# Patient Record
Sex: FEMALE | Race: WHITE | NOT HISPANIC OR LATINO | Employment: FULL TIME | URBAN - METROPOLITAN AREA
[De-identification: names, ages, dates, MRNs, and addresses within clinical notes are randomized per-mention and may not be internally consistent; named-entity substitution may affect disease eponyms.]

---

## 2018-03-08 ENCOUNTER — HISTORICAL (OUTPATIENT)
Dept: SURGERY | Facility: HOSPITAL | Age: 23
End: 2018-03-08

## 2018-03-08 LAB
APTT PPP: 24.8 SECOND(S) (ref 25–35)
B-HCG SERPL QL: NEGATIVE
INR PPP: 1 (ref 0–1.2)
PROTHROMBIN TIME: 10.7 SECOND(S) (ref 9–12)

## 2018-03-13 ENCOUNTER — HISTORICAL (OUTPATIENT)
Dept: ANESTHESIOLOGY | Facility: HOSPITAL | Age: 23
End: 2018-03-13

## 2018-03-13 LAB — B-HCG SERPL QL: NEGATIVE

## 2020-01-29 ENCOUNTER — HISTORICAL (OUTPATIENT)
Dept: LAB | Facility: HOSPITAL | Age: 25
End: 2020-01-29

## 2020-01-29 LAB
ABS NEUT (OLG): 5 X10(3)/MCL (ref 1.5–6.9)
ALBUMIN SERPL-MCNC: 4.2 GM/DL (ref 3.4–5)
ALBUMIN/GLOB SERPL: 1 RATIO
ALP SERPL-CCNC: 54 UNIT/L (ref 30–113)
ALT SERPL-CCNC: 23 UNIT/L (ref 10–45)
AST SERPL-CCNC: 14 UNIT/L (ref 15–37)
BASOPHILS NFR BLD MANUAL: 1 % (ref 0–1)
BILIRUB SERPL-MCNC: 0.6 MG/DL (ref 0.1–0.9)
BILIRUBIN DIRECT+TOT PNL SERPL-MCNC: 0.1 MG/DL (ref 0–0.3)
BILIRUBIN DIRECT+TOT PNL SERPL-MCNC: 0.5 MG/DL
BUN SERPL-MCNC: 12 MG/DL (ref 10–20)
CALCIUM SERPL-MCNC: 9.4 MG/DL (ref 8–10.5)
CHLORIDE SERPL-SCNC: 106 MMOL/L (ref 100–108)
CHOLEST SERPL-MCNC: 129 MG/DL (ref 140–200)
CHOLEST/HDLC SERPL: 3 MG/DL (ref 0–8)
CO2 SERPL-SCNC: 27 MMOL/L (ref 21–35)
CREAT SERPL-MCNC: 0.88 MG/DL (ref 0.7–1.3)
EOSINOPHIL NFR BLD MANUAL: 5 % (ref 0–5)
ERYTHROCYTE [DISTWIDTH] IN BLOOD BY AUTOMATED COUNT: 13.2 % (ref 11.5–17)
EST. AVERAGE GLUCOSE BLD GHB EST-MCNC: 94 MG/DL
GLOBULIN SER-MCNC: 4.2 GM/DL
GLUCOSE SERPL-MCNC: 85 MG/DL (ref 75–116)
GRANULOCYTES NFR BLD MANUAL: 61 % (ref 47–80)
HBA1C MFR BLD: 4.9 % (ref 4.8–6)
HCT VFR BLD AUTO: 46.1 % (ref 36–48)
HDLC SERPL-MCNC: 51 MG/DL (ref 35–59)
HGB BLD-MCNC: 14.9 GM/DL (ref 12–16)
LDLC SERPL CALC-MCNC: 76 MG/DL (ref 100–129)
LYMPHOCYTES NFR BLD MANUAL: 26 % (ref 15–40)
MCH RBC QN AUTO: 31 PG (ref 27–34)
MCHC RBC AUTO-ENTMCNC: 32 GM/DL (ref 31–36)
MCV RBC AUTO: 95 FL (ref 80–99)
MONOCYTES NFR BLD MANUAL: 7 % (ref 4–12)
NEUTROPHILS # BLD AUTO: 5 X10(3)/MCL (ref 1.5–6.9)
PLATELET # BLD AUTO: 235 X10(3)/MCL (ref 140–400)
PLATELET # BLD EST: ADEQUATE 10*3/UL
PMV BLD AUTO: 9.9 FL (ref 6.8–10)
POTASSIUM SERPL-SCNC: 4.4 MMOL/L (ref 3.6–5.2)
PROT SERPL-MCNC: 8.4 GM/DL (ref 6.4–8.2)
RBC # BLD AUTO: 4.85 X10(6)/MCL (ref 4.2–5.4)
RBC MORPH BLD: NORMAL
SODIUM SERPL-SCNC: 142 MMOL/L (ref 135–145)
T4 SERPL-MCNC: 7 MCG/DL (ref 5.3–11.5)
TRIGL SERPL-MCNC: 65 MG/DL (ref 35–150)
TSH SERPL-ACNC: 1.21 MIU/ML (ref 0.36–3.74)
VLDLC SERPL CALC-MCNC: 13 MG/DL
WBC # SPEC AUTO: 8.3 X10(3)/MCL (ref 4.5–11.5)

## 2021-05-19 ENCOUNTER — HISTORICAL (OUTPATIENT)
Dept: ADMINISTRATIVE | Facility: HOSPITAL | Age: 26
End: 2021-05-19

## 2022-04-10 ENCOUNTER — HISTORICAL (OUTPATIENT)
Dept: ADMINISTRATIVE | Facility: HOSPITAL | Age: 27
End: 2022-04-10

## 2022-04-11 ENCOUNTER — HISTORICAL (OUTPATIENT)
Dept: ADMINISTRATIVE | Facility: HOSPITAL | Age: 27
End: 2022-04-11

## 2022-04-28 VITALS
OXYGEN SATURATION: 99 % | SYSTOLIC BLOOD PRESSURE: 125 MMHG | WEIGHT: 220 LBS | HEIGHT: 64 IN | DIASTOLIC BLOOD PRESSURE: 72 MMHG | BODY MASS INDEX: 37.56 KG/M2

## 2022-04-28 VITALS
SYSTOLIC BLOOD PRESSURE: 125 MMHG | OXYGEN SATURATION: 99 % | BODY MASS INDEX: 37.56 KG/M2 | WEIGHT: 220 LBS | HEIGHT: 64 IN | DIASTOLIC BLOOD PRESSURE: 72 MMHG

## 2022-04-29 NOTE — OP NOTE
Patient:   Natalie Ibarra            MRN: [id number]            FIN: [id number]               Age:   23 years     Sex:  Female     :  1995   Associated Diagnoses:   Stomach cramps; Gastritis; Diarrhea; Abdominal pain   Author:   Tita Lucio MD      Operative Note   Operative Information   Date/ Time:  3/13/2018 11:33:00.     Procedures Performed: Procedure Code   Colonoscopy, flexible; diagnostic, including collection of specimen(s) by brushing or washing, when performed (separate procedure) (17185).  Colonoscopy, flexible; with biopsy, single or multiple (58646).  Esophagogastroduodenoscopy, flexible, transoral; diagnostic, including collection of specimen(s) by brushing or washing, when performed (separate procedure) (09818).  Esophagogastroduodenoscopy, flexible, transoral; with biopsy, single or multiple (64053)..     Indications: 23-year-old white female with ongoing complaints of abdominal bloating nausea with vomiting of undigested food in need of diagnostic evaluation with family history of GI malignancy..     Preoperative Diagnosis: Stomach cramps (LSS20-EQ R10.9), Gastritis (JRE02-JT K29.70), Diarrhea (GHP22-BY R19.7), Abdominal pain (CFA21-HK R10.9).     Postoperative Diagnosis: Stomach cramps (FNP33-RP R10.9), Gastritis (XKY70-PC K29.70), Diarrhea (CNN84-PJ R19.7), Abdominal pain (NJJ14-PP R10.9).     Surgeon: Tita Lucio MD.     Anesthesia: intravenous Mac.     Speciman Removed: 1. gastric antral mucosa  2. Sigmoid colon mucosa  3. Rectal mucosa.     Description of Procedure/Findings/    Complications: procedure in detail: Patient was brought to the endoscopy suite laid in slight supine position and intravenous anesthesia provided. An endoscope was passed through the oropharynx intubating the esophagus with easy transit into the stomach. Upon entering the stomach was fully insufflated. It was advanced in the duodenum which and third fourth portion all appeared to be grossly  normal. Scope was then withdrawn back into the stomach and biopsies performed of the gastric antrum ×1.  the scope was then retroflexed revealing a normal appearing cardia fundus body of the stomach otherwise. Return to new to position the scope was withdrawn to the Z line measuring approximately 35 cm from the incisors. There were no signs of reflux or changes of the GE junction seen. The scope was then slowly withdrawn and a neutral position and after the stomach was evacuated of air. The patient was then transferred to the left lateral decubitus position for colonoscopy.    Digital rectal exam performed exhibiting good anal rectal tone and no masses. An endoscope was passed through the anus into main the rectum and with gentle insufflation reaching the cecum. Upon reaching the cecum pictures are taken. Scope was slowly withdrawn the mucosa evaluated. Overall the cecum ascending transverse descending and rectosigmoid colon all appeared to be grossly normal without changes seen. There were no masses or polyps identified. Random biopsies were however performed in the sigmoid and rectum region. These areas appeared to have excessive amounts of mucus noted. The scope was then withdrawn to the distal rectum retroflexed revealing no internal hemorrhoids. Scope was then returned to a neutral position the colon was evacuated of air. The patient was then relieved anesthesia stable condition and transferred to postanesthesia care unit..     Esimated blood loss: loss  3  cc.     Findings: overall normal-appearing EGD and colonoscopy.     Complications: None.

## 2022-06-02 ENCOUNTER — HOSPITAL ENCOUNTER (EMERGENCY)
Facility: HOSPITAL | Age: 27
Discharge: LEFT BEFORE DISCHARGE | End: 2022-06-02
Payer: MEDICAID

## 2022-06-02 VITALS
SYSTOLIC BLOOD PRESSURE: 136 MMHG | HEART RATE: 83 BPM | BODY MASS INDEX: 31.65 KG/M2 | HEIGHT: 65 IN | RESPIRATION RATE: 16 BRPM | OXYGEN SATURATION: 99 % | WEIGHT: 190 LBS | DIASTOLIC BLOOD PRESSURE: 90 MMHG | TEMPERATURE: 99 F

## 2022-06-02 LAB
AMORPH PHOS CRY URNS QL MICRO: ABNORMAL /HPF
APPEARANCE UR: CLEAR
BACTERIA #/AREA URNS AUTO: ABNORMAL /HPF
BILIRUB UR QL STRIP.AUTO: NEGATIVE MG/DL
COLOR UR AUTO: YELLOW
GLUCOSE UR QL STRIP.AUTO: NEGATIVE MG/DL
KETONES UR QL STRIP.AUTO: NEGATIVE MG/DL
LEUKOCYTE ESTERASE UR QL STRIP.AUTO: NEGATIVE UNIT/L
NITRITE UR QL STRIP.AUTO: NEGATIVE
PH UR STRIP.AUTO: 8 [PH]
PROT UR QL STRIP.AUTO: NEGATIVE MG/DL
RBC #/AREA URNS AUTO: ABNORMAL /HPF
RBC UR QL AUTO: ABNORMAL UNIT/L
SP GR UR STRIP.AUTO: 1.02
SQUAMOUS #/AREA URNS AUTO: ABNORMAL /LPF
UROBILINOGEN UR STRIP-ACNC: 0.2 MG/DL
WBC #/AREA URNS AUTO: ABNORMAL /HPF

## 2022-06-02 PROCEDURE — 99900041 HC LEFT WITHOUT BEING SEEN- EMERGENCY

## 2022-06-02 PROCEDURE — 81001 URINALYSIS AUTO W/SCOPE: CPT | Performed by: GENERAL ACUTE CARE HOSPITAL

## 2022-07-28 ENCOUNTER — HOSPITAL ENCOUNTER (EMERGENCY)
Facility: HOSPITAL | Age: 27
Discharge: LEFT BEFORE DISCHARGE | End: 2022-07-28
Attending: EMERGENCY MEDICINE
Payer: MEDICAID

## 2022-07-28 VITALS
WEIGHT: 186 LBS | SYSTOLIC BLOOD PRESSURE: 118 MMHG | DIASTOLIC BLOOD PRESSURE: 86 MMHG | RESPIRATION RATE: 18 BRPM | BODY MASS INDEX: 30.99 KG/M2 | HEIGHT: 65 IN | TEMPERATURE: 98 F | OXYGEN SATURATION: 99 % | HEART RATE: 78 BPM

## 2022-07-28 DIAGNOSIS — J02.0 STREPTOCOCCAL SORE THROAT: ICD-10-CM

## 2022-07-28 DIAGNOSIS — Z53.21 ELOPED FROM EMERGENCY DEPARTMENT: Primary | ICD-10-CM

## 2022-07-28 LAB
SARS-COV-2 RDRP RESP QL NAA+PROBE: NEGATIVE
STREP A PCR (OHS): DETECTED

## 2022-07-28 PROCEDURE — 87635 SARS-COV-2 COVID-19 AMP PRB: CPT | Performed by: EMERGENCY MEDICINE

## 2022-07-28 PROCEDURE — 87631 RESP VIRUS 3-5 TARGETS: CPT | Performed by: EMERGENCY MEDICINE

## 2022-07-28 PROCEDURE — 99282 EMERGENCY DEPT VISIT SF MDM: CPT

## 2022-07-28 NOTE — ED PROVIDER NOTES
Encounter Date: 7/28/2022       History     Chief Complaint   Patient presents with    Throat Pain     Pt c/o sore throat and bilateral ear pain x 2 days. Fever 101 this am.     Patient eloped was never seen by the emergency physician high was riding her discharge instructions anticipating going in the room and treating her strep throat she eloped without being seen        Review of patient's allergies indicates:  No Known Allergies  Past Medical History:   Diagnosis Date    Anxiety disorder, unspecified     Asthma      Past Surgical History:   Procedure Laterality Date    ADENOIDECTOMY      CHOLECYSTECTOMY       No family history on file.  Social History     Tobacco Use    Smoking status: Current Every Day Smoker     Packs/day: 0.50     Types: Cigarettes    Smokeless tobacco: Never Used   Substance Use Topics    Alcohol use: Yes     Comment: occasional    Drug use: Yes     Types: Marijuana     Comment: occasional     Review of Systems    Physical Exam     Initial Vitals [07/28/22 1216]   BP Pulse Resp Temp SpO2   118/86 78 18 98.4 °F (36.9 °C) 99 %      MAP       --         Physical Exam    ED Course   Procedures  Labs Reviewed   STREP GROUP A BY PCR - Abnormal; Notable for the following components:       Result Value    STREP A PCR ([facility]) Detected (*)     All other components within normal limits   SARS-COV-2 RNA AMPLIFICATION, QUAL - Normal          Imaging Results    None          Medications - No data to display                       Clinical Impression:   Final diagnoses:  [Z53.21] Eloped from emergency department (Primary)  [J02.0] Streptococcal sore throat          ED Disposition Condition    AMA               Boubacar Vega MD  07/28/22 8328

## 2022-07-28 NOTE — DISCHARGE INSTRUCTIONS
Warm saline gargles 1/2 tsp salt per cup per  water every couple of hours if needed for pain    Continue Tylenol or ibuprofen for any pain

## 2022-08-20 ENCOUNTER — HOSPITAL ENCOUNTER (EMERGENCY)
Facility: HOSPITAL | Age: 27
Discharge: HOME | End: 2022-08-20
Attending: INTERNAL MEDICINE
Payer: MEDICAID

## 2022-08-20 VITALS
BODY MASS INDEX: 31.16 KG/M2 | OXYGEN SATURATION: 98 % | SYSTOLIC BLOOD PRESSURE: 117 MMHG | DIASTOLIC BLOOD PRESSURE: 73 MMHG | HEIGHT: 65 IN | RESPIRATION RATE: 20 BRPM | WEIGHT: 187 LBS | TEMPERATURE: 98 F | HEART RATE: 65 BPM

## 2022-08-20 DIAGNOSIS — R07.81 PLEURITIC CHEST PAIN: Primary | ICD-10-CM

## 2022-08-20 LAB
ALBUMIN SERPL-MCNC: 3.7 GM/DL (ref 3.5–5)
ALBUMIN/GLOB SERPL: 1.1 RATIO (ref 1.1–2)
ALP SERPL-CCNC: 58 UNIT/L (ref 40–150)
ALT SERPL-CCNC: 8 UNIT/L (ref 0–55)
APPEARANCE UR: CLEAR
AST SERPL-CCNC: 13 UNIT/L (ref 5–34)
B-HCG SERPL QL: NEGATIVE
BACTERIA #/AREA URNS AUTO: ABNORMAL /HPF
BASOPHILS # BLD AUTO: 0.06 X10(3)/MCL (ref 0–0.2)
BASOPHILS NFR BLD AUTO: 0.5 %
BILIRUB UR QL STRIP.AUTO: NEGATIVE MG/DL
BILIRUBIN DIRECT+TOT PNL SERPL-MCNC: 0.7 MG/DL
BUN SERPL-MCNC: 8 MG/DL (ref 7–18.7)
CALCIUM SERPL-MCNC: 9.1 MG/DL (ref 8.4–10.2)
CHLORIDE SERPL-SCNC: 109 MMOL/L (ref 98–107)
CO2 SERPL-SCNC: 21 MMOL/L (ref 22–29)
COLOR UR AUTO: YELLOW
CREAT SERPL-MCNC: 0.76 MG/DL (ref 0.55–1.02)
EOSINOPHIL # BLD AUTO: 0.29 X10(3)/MCL (ref 0–0.9)
EOSINOPHIL NFR BLD AUTO: 2.6 %
ERYTHROCYTE [DISTWIDTH] IN BLOOD BY AUTOMATED COUNT: 13.2 % (ref 11.5–17)
GFR SERPLBLD CREATININE-BSD FMLA CKD-EPI: >60 MLS/MIN/1.73/M2
GLOBULIN SER-MCNC: 3.4 GM/DL (ref 2.4–3.5)
GLUCOSE SERPL-MCNC: 85 MG/DL (ref 74–100)
GLUCOSE UR QL STRIP.AUTO: NEGATIVE MG/DL
HCT VFR BLD AUTO: 40 % (ref 37–47)
HGB BLD-MCNC: 13.6 GM/DL (ref 12–16)
IMM GRANULOCYTES # BLD AUTO: 0.05 X10(3)/MCL (ref 0–0.04)
IMM GRANULOCYTES NFR BLD AUTO: 0.4 %
KETONES UR QL STRIP.AUTO: NEGATIVE MG/DL
LEUKOCYTE ESTERASE UR QL STRIP.AUTO: ABNORMAL UNIT/L
LYMPHOCYTES # BLD AUTO: 2.15 X10(3)/MCL (ref 0.6–4.6)
LYMPHOCYTES NFR BLD AUTO: 19 %
MCH RBC QN AUTO: 31.9 PG (ref 27–31)
MCHC RBC AUTO-ENTMCNC: 34 MG/DL (ref 33–36)
MCV RBC AUTO: 93.7 FL (ref 80–94)
MONOCYTES # BLD AUTO: 0.7 X10(3)/MCL (ref 0.1–1.3)
MONOCYTES NFR BLD AUTO: 6.2 %
NEUTROPHILS # BLD AUTO: 8.1 X10(3)/MCL (ref 2.1–9.2)
NEUTROPHILS NFR BLD AUTO: 71.3 %
NITRITE UR QL STRIP.AUTO: NEGATIVE
PH UR STRIP.AUTO: 5 [PH]
PLATELET # BLD AUTO: 252 X10(3)/MCL (ref 130–400)
PMV BLD AUTO: 10.1 FL (ref 7.4–10.4)
POTASSIUM SERPL-SCNC: 4.2 MMOL/L (ref 3.5–5.1)
PROT SERPL-MCNC: 7.1 GM/DL (ref 6.4–8.3)
PROT UR QL STRIP.AUTO: NEGATIVE MG/DL
RBC # BLD AUTO: 4.27 X10(6)/MCL (ref 4.2–5.4)
RBC #/AREA URNS AUTO: ABNORMAL /HPF
RBC UR QL AUTO: NEGATIVE UNIT/L
SODIUM SERPL-SCNC: 139 MMOL/L (ref 136–145)
SP GR UR STRIP.AUTO: 1.02
SQUAMOUS #/AREA URNS AUTO: ABNORMAL /HPF
UROBILINOGEN UR STRIP-ACNC: 0.2 MG/DL
WBC # SPEC AUTO: 11.3 X10(3)/MCL (ref 4.5–11.5)
WBC #/AREA URNS AUTO: ABNORMAL /HPF

## 2022-08-20 PROCEDURE — 85025 COMPLETE CBC W/AUTO DIFF WBC: CPT | Performed by: INTERNAL MEDICINE

## 2022-08-20 PROCEDURE — 36415 COLL VENOUS BLD VENIPUNCTURE: CPT | Performed by: INTERNAL MEDICINE

## 2022-08-20 PROCEDURE — 99284 EMERGENCY DEPT VISIT MOD MDM: CPT | Mod: 25

## 2022-08-20 PROCEDURE — 80053 COMPREHEN METABOLIC PANEL: CPT | Performed by: INTERNAL MEDICINE

## 2022-08-20 PROCEDURE — 81025 URINE PREGNANCY TEST: CPT | Performed by: INTERNAL MEDICINE

## 2022-08-20 PROCEDURE — 81001 URINALYSIS AUTO W/SCOPE: CPT | Performed by: INTERNAL MEDICINE

## 2022-08-20 RX ORDER — IBUPROFEN 400 MG/1
400 TABLET ORAL EVERY 6 HOURS PRN
Qty: 20 TABLET | Refills: 0 | Status: SHIPPED | OUTPATIENT
Start: 2022-08-20 | End: 2024-01-25 | Stop reason: ALTCHOICE

## 2022-08-20 NOTE — ED PROVIDER NOTES
08/20/2022         11:26 AM    Source of History:  History obtained from the patient.       Chief complaint:  From Nurse Triage:  Pleurisy (Pt c/o mid chest pain radiating to rt shoulder and arm since yesterday. Pt states pain worse with movement and coughing.)    HPI:  Natalie Ibarra is a 27 y.o. female presenting with Pleurisy (Pt c/o mid chest pain radiating to rt shoulder and arm since yesterday. Pt states pain worse with movement and coughing.)       Patient with history of asthma comes to the emergency room with complaint of right-sided chest pain which is going on for the last 2-3 days mainly in the right upper chest going to the right shoulder gets worse when she moves the arm and gets worse when she takes a deep breath or she coughs.  Denies any injuries, denies any other complaints whatsoever including fever, chills, cough, no shortness of breath.  She does not feel it is her asthma    Review of Systems   Constitutional symptoms:  No Fever. No Chills    Skin symptoms:  No Rash.    Eye symptoms:  No Visual disturbance reported.   ENMT symptoms:  No Sore throat,    Respiratory symptoms:  No Shortness of Breath, no Cough, no Wheezing.    Cardiovascular symptoms: Chest Pain right upper, going to the right shoulder, No Palpitations, No Tachycardia.    Gastrointestinal symptoms:  No Abdominal Pain, No Nausea, No Vomiting, No Diarrhea, No Constipation.    Genitourinary symptoms:  No Dysuria,    Musculoskeletal symptoms:  No Back pain,    Neurologic symptoms:  No Headache, No Dizziness.    Psychiatric symptoms:  No Anxiety, No Depression, No Substance Abuse.              Additional review of systems information: Patient Denies Any Other Complaints.  All Other Systems Reviewed With Patient And Negative.    ALLEGIES:  Review of patient's allergies indicates:  No Known Allergies    HOME MEDICINES:  No current facility-administered medications for this encounter.    Current Outpatient Medications:     ibuprofen  (ADVIL,MOTRIN) 400 MG tablet, Take 1 tablet (400 mg total) by mouth every 6 (six) hours as needed., Disp: 20 tablet, Rfl: 0    PMH:  As per HPI and below:    Reviewed and updated in chart.    PAST MEDICAL HISTORY:  Past Medical History:   Diagnosis Date    Anxiety disorder, unspecified     Asthma     Depression         PAST SURGICAL HISTORY:  Past Surgical History:   Procedure Laterality Date    ADENOIDECTOMY      CHOLECYSTECTOMY         SOCIAL HISTORY:  Social History     Tobacco Use    Smoking status: Current Every Day Smoker     Packs/day: 0.50     Types: Cigarettes    Smokeless tobacco: Never Used   Substance Use Topics    Alcohol use: Yes     Comment: occasional    Drug use: Yes     Types: Marijuana     Comment: occasional       FAMILY HISTORY:  Family History   Problem Relation Age of Onset    Hypertension Mother         PROBLEM LIST:  There is no problem list on file for this patient.       PHYSICAL EXAM:      ED Triage Vitals [08/20/22 1028]   /73   Pulse 65   Resp 20   Temp 98.4 °F (36.9 °C)   SpO2 98 %        Vital Signs: Reviewed As In Chart.  General:  Alert, No Cardiorespiratory Distress Noted.   Skin: Normal For Ethnic Origin  Eye:  Extraocular Movements Are Intact.   Cardiovascular:  Regular Rate And Rhythm, No Murmur, No Pedal Edema.    Respiratory:  Respirations Nonlabored, No Respiratory Distress, Good Bilateral Air Entry, No Rales, No Rhonchi.  Mild tenderness in the right upper chest wall, mild tenderness in the right shoulder   Musculoskeletal:  No Gross Deformity Noted.   Gastrointestinal:  Soft, Non Distended, Non Tender, Normal Bowel Sounds.    Neurological:  Alert And Oriented To Person, Place, Time, And Situation, Normal Motor Observed, Normal Speech Observed.    Psychiatric:  Cooperative, Appropriate Mood & Affect.    INITIAL IMPRESSION/ DIFFERENTIAL DX:      MEDICAL DECISION MAKING:      Reviewed Nurses Note. Reviewed Vital Signs.     Reviewed Pertinent old records,  History and updated as necessary.    27 y.o. female with Pleurisy (Pt c/o mid chest pain radiating to rt shoulder and arm since yesterday. Pt states pain worse with movement and coughing.)    Patient with right-sided chest pain going to the right shoulder, gets worse on movement and on coughing and on deep breathing.  We are going to do a chest x-ray blood work and decide further.  She says she was not exposed to anybody with COVID-19.    ED WORKUP AND COURSE:  ED ORDERS:  Orders Placed This Encounter   Procedures    X-Ray Chest 1 View    CBC auto differential    Comprehensive metabolic panel    Urinalysis    HCG Qualitative Urine    CBC with Differential    Urinalysis, Microscopic       Medications - No data to display             ED LABS ORDERED AND REVIEWED:  Admission on 08/20/2022   Component Date Value Ref Range Status    Sodium Level 08/20/2022 139  136 - 145 mmol/L Final    Potassium Level 08/20/2022 4.2  3.5 - 5.1 mmol/L Final    Chloride 08/20/2022 109 (A) 98 - 107 mmol/L Final    Carbon Dioxide 08/20/2022 21 (A) 22 - 29 mmol/L Final    Glucose Level 08/20/2022 85  74 - 100 mg/dL Final    Blood Urea Nitrogen 08/20/2022 8.0  7.0 - 18.7 mg/dL Final    Creatinine 08/20/2022 0.76  0.55 - 1.02 mg/dL Final    Calcium Level Total 08/20/2022 9.1  8.4 - 10.2 mg/dL Final    Protein Total 08/20/2022 7.1  6.4 - 8.3 gm/dL Final    Albumin Level 08/20/2022 3.7  3.5 - 5.0 gm/dL Final    Globulin 08/20/2022 3.4  2.4 - 3.5 gm/dL Final    Albumin/Globulin Ratio 08/20/2022 1.1  1.1 - 2.0 ratio Final    Bilirubin Total 08/20/2022 0.7  <=1.5 mg/dL Final    Alkaline Phosphatase 08/20/2022 58  40 - 150 unit/L Final    Alanine Aminotransferase 08/20/2022 8  0 - 55 unit/L Final    Aspartate Aminotransferase 08/20/2022 13  5 - 34 unit/L Final    eGFR 08/20/2022 >60  mls/min/1.73/m2 Final    Color, UA 08/20/2022 Yellow  Yellow, Colorless, Other, Clear Final    Appearance,  08/20/2022 Clear  Clear Final     Specific Gravity, UA 08/20/2022 1.025   Final    pH, UA 08/20/2022 5.0  5.0, 5.5, 6.0, 6.5, 7.0, 7.5, 8.0, 8.5 Final    Protein, UA 08/20/2022 Negative  Negative, 300  mg/dL Final    Glucose, UA 08/20/2022 Negative  Negative, Normal mg/dL Final    Ketones, UA 08/20/2022 Negative  Negative, +1, +2, +3, +4, +5, >=160, >=80 mg/dL Final    Blood, UA 08/20/2022 Negative  Negative unit/L Final    Bilirubin, UA 08/20/2022 Negative  Negative mg/dL Final    Urobilinogen, UA 08/20/2022 0.2  0.2, 1.0, Normal mg/dL Final    Nitrites, UA 08/20/2022 Negative  Negative Final    Leukocyte Esterase, UA 08/20/2022 Small (A) Negative, 75  unit/L Final    Beta hCG Qualitative, Urine 08/20/2022 Negative  Negative Final    WBC 08/20/2022 11.3  4.5 - 11.5 x10(3)/mcL Final    RBC 08/20/2022 4.27  4.20 - 5.40 x10(6)/mcL Final    Hgb 08/20/2022 13.6  12.0 - 16.0 gm/dL Final    Hct 08/20/2022 40.0  37.0 - 47.0 % Final    MCV 08/20/2022 93.7  80.0 - 94.0 fL Final    MCH 08/20/2022 31.9 (A) 27.0 - 31.0 pg Final    MCHC 08/20/2022 34.0  33.0 - 36.0 mg/dL Final    RDW 08/20/2022 13.2  11.5 - 17.0 % Final    Platelet 08/20/2022 252  130 - 400 x10(3)/mcL Final    MPV 08/20/2022 10.1  7.4 - 10.4 fL Final    Neut % 08/20/2022 71.3  % Final    Lymph % 08/20/2022 19.0  % Final    Mono % 08/20/2022 6.2  % Final    Eos % 08/20/2022 2.6  % Final    Basophil % 08/20/2022 0.5  % Final    Lymph # 08/20/2022 2.15  0.6 - 4.6 x10(3)/mcL Final    Neut # 08/20/2022 8.1  2.1 - 9.2 x10(3)/mcL Final    Mono # 08/20/2022 0.70  0.1 - 1.3 x10(3)/mcL Final    Eos # 08/20/2022 0.29  0 - 0.9 x10(3)/mcL Final    Baso # 08/20/2022 0.06  0 - 0.2 x10(3)/mcL Final    IG# 08/20/2022 0.05 (A) 0 - 0.04 x10(3)/mcL Final    IG% 08/20/2022 0.4  % Final    Bacteria, UA 08/20/2022 Rare  None Seen, Rare, Occasional /HPF Final    RBC, UA 08/20/2022 0-2  None Seen, 0-2, 3-5, 0-5 /HPF Final    WBC, UA 08/20/2022 0-2  None Seen, 0-2, 3-5, 0-5 /HPF  Final    Squamous Epithelial Cells, UA 08/20/2022 Few (A) None Seen, Rare, Occasional, Occ /HPF Final       RADIOLOGY STUDIES ORDERED AND REVIEWED:  Imaging Results          X-Ray Chest 1 View (Preliminary result)  Result time 08/20/22 11:28:06    ED Interpretation by Sandra Troy MD (08/20/22 11:28:06, [facility] - Emergency Dept, Emergency Medicine)    Chest One View: No Focal Consolidation, No Acute Cardiopulmonary abnormality identified grossly.                              ED COURSE AND REEVALUATIONS:  Vitals:    08/20/22 1028   BP: 117/73   Pulse: 65   Resp: 20   Temp: 98.4 °F (36.9 °C)       PROCEDURES PERFORMED IN ED:  Procedures                DIAGNOSTIC IMPRESSION:      1. Pleuritic chest pain         ED Disposition Condition    Discharge Stable             Medication List      START taking these medications    ibuprofen 400 MG tablet  Commonly known as: ADVIL,MOTRIN  Take 1 tablet (400 mg total) by mouth every 6 (six) hours as needed.           Where to Get Your Medications      These medications were sent to [facility] [address] [facility] - [address]  [address] [place] [address]    Phone: [phone]   · ibuprofen 400 MG tablet           Follow-up Information     PMD In 2 days.                        ED Prescriptions     Medication Sig Dispense Start Date End Date Auth. Provider    ibuprofen (ADVIL,MOTRIN) 400 MG tablet Take 1 tablet (400 mg total) by mouth every 6 (six) hours as needed. 20 tablet 8/20/2022  Sandra Troy MD        Follow-up Information     Follow up With Specialties Details Why Contact Info    PMD  In 2 days             Sandra Troy MD  08/20/22 8335

## 2022-08-20 NOTE — ED NOTES
Pt to ED via POV for chest pain. Pt reports mid chest pain radiating to right shoulder and arm since yesterday. PT states pain worse with cough and movement. Pt ambulatory, no distress, all safety and personal needs addressed.

## 2022-12-03 ENCOUNTER — HOSPITAL ENCOUNTER (EMERGENCY)
Facility: HOSPITAL | Age: 27
Discharge: HOME | End: 2022-12-04
Attending: STUDENT IN AN ORGANIZED HEALTH CARE EDUCATION/TRAINING PROGRAM
Payer: MEDICAID

## 2022-12-03 DIAGNOSIS — N30.00 ACUTE CYSTITIS WITHOUT HEMATURIA: ICD-10-CM

## 2022-12-03 DIAGNOSIS — R06.2 WHEEZING: ICD-10-CM

## 2022-12-03 DIAGNOSIS — J45.901 EXACERBATION OF ASTHMA, UNSPECIFIED ASTHMA SEVERITY, UNSPECIFIED WHETHER PERSISTENT: Primary | ICD-10-CM

## 2022-12-03 LAB
ALBUMIN SERPL-MCNC: 4.1 GM/DL (ref 3.5–5)
ALBUMIN/GLOB SERPL: 1.2 RATIO (ref 1.1–2)
ALP SERPL-CCNC: 55 UNIT/L (ref 40–150)
ALT SERPL-CCNC: 9 UNIT/L (ref 0–55)
AMORPH URATE CRY URNS QL MICRO: ABNORMAL /HPF
APPEARANCE UR: ABNORMAL
AST SERPL-CCNC: 17 UNIT/L (ref 5–34)
B-HCG SERPL QL: NEGATIVE
BACTERIA #/AREA URNS AUTO: ABNORMAL /HPF
BASOPHILS # BLD AUTO: 0.09 X10(3)/MCL (ref 0–0.2)
BASOPHILS NFR BLD AUTO: 0.4 %
BILIRUB UR QL STRIP.AUTO: ABNORMAL MG/DL
BILIRUBIN DIRECT+TOT PNL SERPL-MCNC: 0.2 MG/DL
BUN SERPL-MCNC: 5 MG/DL (ref 7–18.7)
CALCIUM SERPL-MCNC: 9.5 MG/DL (ref 8.4–10.2)
CAOX CRY URNS QL MICRO: ABNORMAL /HPF
CHLORIDE SERPL-SCNC: 110 MMOL/L (ref 98–107)
CO2 SERPL-SCNC: 21 MMOL/L (ref 22–29)
COLOR UR AUTO: YELLOW
CREAT SERPL-MCNC: 0.81 MG/DL (ref 0.55–1.02)
EOSINOPHIL # BLD AUTO: 0.26 X10(3)/MCL (ref 0–0.9)
EOSINOPHIL NFR BLD AUTO: 1.2 %
ERYTHROCYTE [DISTWIDTH] IN BLOOD BY AUTOMATED COUNT: 13.4 % (ref 11.5–17)
GFR SERPLBLD CREATININE-BSD FMLA CKD-EPI: >60 MLS/MIN/1.73/M2
GLOBULIN SER-MCNC: 3.5 GM/DL (ref 2.4–3.5)
GLUCOSE SERPL-MCNC: 123 MG/DL (ref 74–100)
GLUCOSE UR QL STRIP.AUTO: NEGATIVE MG/DL
HCT VFR BLD AUTO: 42 % (ref 37–47)
HGB BLD-MCNC: 14 GM/DL (ref 12–16)
IMM GRANULOCYTES # BLD AUTO: 0.1 X10(3)/MCL (ref 0–0.04)
IMM GRANULOCYTES NFR BLD AUTO: 0.5 %
KETONES UR QL STRIP.AUTO: 15 MG/DL
LEUKOCYTE ESTERASE UR QL STRIP.AUTO: ABNORMAL UNIT/L
LYMPHOCYTES # BLD AUTO: 3.63 X10(3)/MCL (ref 0.6–4.6)
LYMPHOCYTES NFR BLD AUTO: 16.8 %
MCH RBC QN AUTO: 31 PG (ref 27–31)
MCHC RBC AUTO-ENTMCNC: 33.3 MG/DL (ref 33–36)
MCV RBC AUTO: 92.9 FL (ref 80–94)
MONOCYTES # BLD AUTO: 1.54 X10(3)/MCL (ref 0.1–1.3)
MONOCYTES NFR BLD AUTO: 7.1 %
MUCOUS THREADS URNS QL MICRO: ABNORMAL /LPF
NEUTROPHILS # BLD AUTO: 16 X10(3)/MCL (ref 2.1–9.2)
NEUTROPHILS NFR BLD AUTO: 74 %
NITRITE UR QL STRIP.AUTO: NEGATIVE
PH UR STRIP.AUTO: 5.5 [PH]
PLATELET # BLD AUTO: 290 X10(3)/MCL (ref 130–400)
PMV BLD AUTO: 10.5 FL (ref 7.4–10.4)
POTASSIUM SERPL-SCNC: 3.2 MMOL/L (ref 3.5–5.1)
PROT SERPL-MCNC: 7.6 GM/DL (ref 6.4–8.3)
PROT UR QL STRIP.AUTO: 30 MG/DL
RBC # BLD AUTO: 4.52 X10(6)/MCL (ref 4.2–5.4)
RBC #/AREA URNS AUTO: ABNORMAL /HPF
RBC UR QL AUTO: NEGATIVE UNIT/L
SODIUM SERPL-SCNC: 141 MMOL/L (ref 136–145)
SP GR UR STRIP.AUTO: >=1.03
SQUAMOUS #/AREA URNS AUTO: ABNORMAL /HPF
UROBILINOGEN UR STRIP-ACNC: 0.2 MG/DL
WBC # SPEC AUTO: 21.6 X10(3)/MCL (ref 4.5–11.5)
WBC #/AREA URNS AUTO: ABNORMAL /HPF

## 2022-12-03 PROCEDURE — 85025 COMPLETE CBC W/AUTO DIFF WBC: CPT | Performed by: STUDENT IN AN ORGANIZED HEALTH CARE EDUCATION/TRAINING PROGRAM

## 2022-12-03 PROCEDURE — 96361 HYDRATE IV INFUSION ADD-ON: CPT

## 2022-12-03 PROCEDURE — 80053 COMPREHEN METABOLIC PANEL: CPT | Performed by: STUDENT IN AN ORGANIZED HEALTH CARE EDUCATION/TRAINING PROGRAM

## 2022-12-03 PROCEDURE — 99284 EMERGENCY DEPT VISIT MOD MDM: CPT | Mod: 25

## 2022-12-03 PROCEDURE — 0240U COVID/FLU A&B PCR: CPT | Performed by: STUDENT IN AN ORGANIZED HEALTH CARE EDUCATION/TRAINING PROGRAM

## 2022-12-03 PROCEDURE — 25000242 PHARM REV CODE 250 ALT 637 W/ HCPCS: Performed by: STUDENT IN AN ORGANIZED HEALTH CARE EDUCATION/TRAINING PROGRAM

## 2022-12-03 PROCEDURE — 81001 URINALYSIS AUTO W/SCOPE: CPT | Performed by: STUDENT IN AN ORGANIZED HEALTH CARE EDUCATION/TRAINING PROGRAM

## 2022-12-03 PROCEDURE — 99900035 HC TECH TIME PER 15 MIN (STAT)

## 2022-12-03 PROCEDURE — 81003 URINALYSIS AUTO W/O SCOPE: CPT | Performed by: STUDENT IN AN ORGANIZED HEALTH CARE EDUCATION/TRAINING PROGRAM

## 2022-12-03 PROCEDURE — 96374 THER/PROPH/DIAG INJ IV PUSH: CPT

## 2022-12-03 PROCEDURE — 63600175 PHARM REV CODE 636 W HCPCS: Performed by: STUDENT IN AN ORGANIZED HEALTH CARE EDUCATION/TRAINING PROGRAM

## 2022-12-03 PROCEDURE — 81025 URINE PREGNANCY TEST: CPT | Performed by: STUDENT IN AN ORGANIZED HEALTH CARE EDUCATION/TRAINING PROGRAM

## 2022-12-03 RX ORDER — IPRATROPIUM BROMIDE AND ALBUTEROL SULFATE 2.5; .5 MG/3ML; MG/3ML
3 SOLUTION RESPIRATORY (INHALATION)
Status: COMPLETED | OUTPATIENT
Start: 2022-12-03 | End: 2022-12-03

## 2022-12-03 RX ORDER — METHYLPREDNISOLONE SOD SUCC 125 MG
125 VIAL (EA) INJECTION
Status: COMPLETED | OUTPATIENT
Start: 2022-12-03 | End: 2022-12-03

## 2022-12-03 RX ORDER — SODIUM CHLORIDE, SODIUM LACTATE, POTASSIUM CHLORIDE, CALCIUM CHLORIDE 600; 310; 30; 20 MG/100ML; MG/100ML; MG/100ML; MG/100ML
1000 INJECTION, SOLUTION INTRAVENOUS
Status: DISCONTINUED | OUTPATIENT
Start: 2022-12-03 | End: 2022-12-04 | Stop reason: HOSPADM

## 2022-12-03 RX ADMIN — IPRATROPIUM BROMIDE AND ALBUTEROL SULFATE 3 ML: .5; 3 SOLUTION RESPIRATORY (INHALATION) at 10:12

## 2022-12-03 RX ADMIN — METHYLPREDNISOLONE SODIUM SUCCINATE 125 MG: 125 INJECTION, POWDER, FOR SOLUTION INTRAMUSCULAR; INTRAVENOUS at 11:12

## 2022-12-03 RX ADMIN — SODIUM CHLORIDE, POTASSIUM CHLORIDE, SODIUM LACTATE AND CALCIUM CHLORIDE 1000 ML: 600; 310; 30; 20 INJECTION, SOLUTION INTRAVENOUS at 11:12

## 2022-12-04 ENCOUNTER — HOSPITAL ENCOUNTER (EMERGENCY)
Facility: HOSPITAL | Age: 27
Discharge: HOME | End: 2022-12-04
Attending: INTERNAL MEDICINE
Payer: MEDICAID

## 2022-12-04 VITALS
BODY MASS INDEX: 34.02 KG/M2 | SYSTOLIC BLOOD PRESSURE: 148 MMHG | WEIGHT: 186 LBS | DIASTOLIC BLOOD PRESSURE: 76 MMHG | TEMPERATURE: 98 F | RESPIRATION RATE: 18 BRPM | OXYGEN SATURATION: 98 % | HEART RATE: 92 BPM

## 2022-12-04 VITALS
RESPIRATION RATE: 20 BRPM | SYSTOLIC BLOOD PRESSURE: 135 MMHG | WEIGHT: 190 LBS | DIASTOLIC BLOOD PRESSURE: 85 MMHG | OXYGEN SATURATION: 94 % | HEIGHT: 62 IN | BODY MASS INDEX: 34.96 KG/M2 | HEART RATE: 119 BPM

## 2022-12-04 DIAGNOSIS — R06.02 SHORTNESS OF BREATH: ICD-10-CM

## 2022-12-04 DIAGNOSIS — J45.20 INTERMITTENT ASTHMA, UNSPECIFIED ASTHMA SEVERITY, UNSPECIFIED WHETHER COMPLICATED: Primary | ICD-10-CM

## 2022-12-04 LAB
ALBUMIN SERPL-MCNC: 4.2 GM/DL (ref 3.5–5)
ALBUMIN/GLOB SERPL: 1.2 RATIO (ref 1.1–2)
ALP SERPL-CCNC: 49 UNIT/L (ref 40–150)
ALT SERPL-CCNC: 10 UNIT/L (ref 0–55)
APTT PPP: 25.4 SECONDS (ref 23.2–33.7)
AST SERPL-CCNC: 17 UNIT/L (ref 5–34)
BASOPHILS # BLD AUTO: 0.02 X10(3)/MCL (ref 0–0.2)
BASOPHILS NFR BLD AUTO: 0.1 %
BILIRUBIN DIRECT+TOT PNL SERPL-MCNC: 0.2 MG/DL
BUN SERPL-MCNC: 4 MG/DL (ref 7–18.7)
CALCIUM SERPL-MCNC: 9.4 MG/DL (ref 8.4–10.2)
CHLORIDE SERPL-SCNC: 111 MMOL/L (ref 98–107)
CO2 SERPL-SCNC: 19 MMOL/L (ref 22–29)
CREAT SERPL-MCNC: 0.71 MG/DL (ref 0.55–1.02)
EOSINOPHIL # BLD AUTO: 0 X10(3)/MCL (ref 0–0.9)
EOSINOPHIL NFR BLD AUTO: 0 %
ERYTHROCYTE [DISTWIDTH] IN BLOOD BY AUTOMATED COUNT: 13.6 % (ref 11.5–17)
FLUAV AG UPPER RESP QL IA.RAPID: NOT DETECTED
FLUBV AG UPPER RESP QL IA.RAPID: NOT DETECTED
GFR SERPLBLD CREATININE-BSD FMLA CKD-EPI: >60 MLS/MIN/1.73/M2
GLOBULIN SER-MCNC: 3.6 GM/DL (ref 2.4–3.5)
GLUCOSE SERPL-MCNC: 97 MG/DL (ref 74–100)
HCT VFR BLD AUTO: 41.6 % (ref 37–47)
HGB BLD-MCNC: 14.1 GM/DL (ref 12–16)
IMM GRANULOCYTES # BLD AUTO: 0.1 X10(3)/MCL (ref 0–0.04)
IMM GRANULOCYTES NFR BLD AUTO: 0.6 %
INR BLD: 1.08 (ref 0–1.3)
LYMPHOCYTES # BLD AUTO: 0.97 X10(3)/MCL (ref 0.6–4.6)
LYMPHOCYTES NFR BLD AUTO: 5.4 %
MCH RBC QN AUTO: 31.8 PG (ref 27–31)
MCHC RBC AUTO-ENTMCNC: 33.9 MG/DL (ref 33–36)
MCV RBC AUTO: 93.9 FL (ref 80–94)
MONOCYTES # BLD AUTO: 0.92 X10(3)/MCL (ref 0.1–1.3)
MONOCYTES NFR BLD AUTO: 5.2 %
NEUTROPHILS # BLD AUTO: 15.8 X10(3)/MCL (ref 2.1–9.2)
NEUTROPHILS NFR BLD AUTO: 88.7 %
PLATELET # BLD AUTO: 265 X10(3)/MCL (ref 130–400)
PMV BLD AUTO: 10.5 FL (ref 7.4–10.4)
POTASSIUM SERPL-SCNC: 3.4 MMOL/L (ref 3.5–5.1)
PROT SERPL-MCNC: 7.8 GM/DL (ref 6.4–8.3)
PROTHROMBIN TIME: 13.9 SECONDS (ref 12.5–14.5)
RBC # BLD AUTO: 4.43 X10(6)/MCL (ref 4.2–5.4)
SARS-COV-2 RNA RESP QL NAA+PROBE: NOT DETECTED
SODIUM SERPL-SCNC: 143 MMOL/L (ref 136–145)
WBC # SPEC AUTO: 17.8 X10(3)/MCL (ref 4.5–11.5)

## 2022-12-04 PROCEDURE — 85025 COMPLETE CBC W/AUTO DIFF WBC: CPT | Performed by: INTERNAL MEDICINE

## 2022-12-04 PROCEDURE — 80053 COMPREHEN METABOLIC PANEL: CPT | Performed by: INTERNAL MEDICINE

## 2022-12-04 PROCEDURE — 85610 PROTHROMBIN TIME: CPT | Performed by: INTERNAL MEDICINE

## 2022-12-04 PROCEDURE — 94640 AIRWAY INHALATION TREATMENT: CPT

## 2022-12-04 PROCEDURE — 25000242 PHARM REV CODE 250 ALT 637 W/ HCPCS: Performed by: INTERNAL MEDICINE

## 2022-12-04 PROCEDURE — 99900031 HC PATIENT EDUCATION (STAT)

## 2022-12-04 PROCEDURE — 99284 EMERGENCY DEPT VISIT MOD MDM: CPT | Mod: 25

## 2022-12-04 PROCEDURE — 94761 N-INVAS EAR/PLS OXIMETRY MLT: CPT

## 2022-12-04 PROCEDURE — 99900035 HC TECH TIME PER 15 MIN (STAT)

## 2022-12-04 PROCEDURE — 25000242 PHARM REV CODE 250 ALT 637 W/ HCPCS: Performed by: STUDENT IN AN ORGANIZED HEALTH CARE EDUCATION/TRAINING PROGRAM

## 2022-12-04 PROCEDURE — 85730 THROMBOPLASTIN TIME PARTIAL: CPT | Performed by: INTERNAL MEDICINE

## 2022-12-04 RX ORDER — IPRATROPIUM BROMIDE AND ALBUTEROL SULFATE 2.5; .5 MG/3ML; MG/3ML
3 SOLUTION RESPIRATORY (INHALATION)
Status: COMPLETED | OUTPATIENT
Start: 2022-12-04 | End: 2022-12-04

## 2022-12-04 RX ORDER — BUDESONIDE 0.5 MG/2ML
0.5 INHALANT ORAL
Status: COMPLETED | OUTPATIENT
Start: 2022-12-04 | End: 2022-12-04

## 2022-12-04 RX ORDER — ALBUTEROL SULFATE 0.83 MG/ML
2.5 SOLUTION RESPIRATORY (INHALATION)
Status: COMPLETED | OUTPATIENT
Start: 2022-12-04 | End: 2022-12-04

## 2022-12-04 RX ORDER — CEFUROXIME AXETIL 500 MG/1
500 TABLET ORAL EVERY 12 HOURS
Qty: 14 TABLET | Refills: 0 | Status: SHIPPED | OUTPATIENT
Start: 2022-12-04 | End: 2022-12-11

## 2022-12-04 RX ORDER — PREDNISONE 20 MG/1
40 TABLET ORAL DAILY
Qty: 10 TABLET | Refills: 0 | Status: SHIPPED | OUTPATIENT
Start: 2022-12-04 | End: 2022-12-09

## 2022-12-04 RX ADMIN — IPRATROPIUM BROMIDE AND ALBUTEROL SULFATE 3 ML: .5; 3 SOLUTION RESPIRATORY (INHALATION) at 01:12

## 2022-12-04 RX ADMIN — ALBUTEROL SULFATE 2.5 MG: 2.5 SOLUTION RESPIRATORY (INHALATION) at 02:12

## 2022-12-04 RX ADMIN — IPRATROPIUM BROMIDE AND ALBUTEROL SULFATE 3 ML: .5; 3 SOLUTION RESPIRATORY (INHALATION) at 12:12

## 2022-12-04 RX ADMIN — BUDESONIDE INHALATION 0.5 MG: 0.5 SUSPENSION RESPIRATORY (INHALATION) at 01:12

## 2022-12-04 NOTE — ED PROVIDER NOTES
12/04/2022         1:01 PM    Source of History:  History obtained from the patient.       Chief complaint:  From Nurse Triage:  Shortness of Breath (C/o continued cough & SOB today. Pt states that she is having another asthma attack. Pt was seen in ED last night )    HISTORY OF PRESENT ILLNES:  Natalie Ibarra is a 27 y.o. female presenting with Shortness of Breath (C/o continued cough & SOB today. Pt states that she is having another asthma attack. Pt was seen in ED last night )       Patient with history of asthma comes to the emergency room with complaint of shortness of breath, she was seen in the emergency room last night also and was discharged home on medicine but she continues to have shortness of breath so came back to the ER and also she started having some nosebleed.    REVIEW OF SYSTEMS:   Constitutional symptoms:  No Fever. No Chills    Skin symptoms:  No Rash.    Eye symptoms:  No Visual disturbance reported.   ENMT symptoms:  No Sore throat,  Nosebleed  Respiratory symptoms:  Shortness of breath, cough,  Wheezing.    Cardiovascular symptoms:  No Chest Pain, No Palpitations, No Tachycardia.    Gastrointestinal symptoms:  No Abdominal Pain, No Nausea, No Vomiting, No Diarrhea, No Constipation.    Genitourinary symptoms:  No Dysuria,    Musculoskeletal symptoms:  No Back pain,    Neurologic symptoms:  No Headache, No Dizziness.    Psychiatric symptoms:   Anxiety, No Depression, No Substance Abuse.              Additional review of systems information: Patient Denies Any Other Complaints.    All Other Systems Reviewed With Patient And Negative.    ALLEGIES:  Review of patient's allergies indicates:   Allergen Reactions    Peanut     Shellfish derived        MEDICINE LIST:  Current Outpatient Medications   Medication Instructions    cefUROXime (CEFTIN) 500 mg, Oral, Every 12 hours    ibuprofen (ADVIL,MOTRIN) 400 mg, Oral, Every 6 hours PRN    predniSONE (DELTASONE) 40 mg, Oral, Daily        PMH:  As per HPI  and below:    Reviewed and updated in chart.    PAST MEDICAL HISTORY:  Past Medical History:   Diagnosis Date    Anxiety disorder, unspecified     Asthma     Depression         PAST SURGICAL HISTORY:  Past Surgical History:   Procedure Laterality Date    ADENOIDECTOMY      CHOLECYSTECTOMY         SOCIAL HISTORY:  Social History     Tobacco Use    Smoking status: Every Day     Packs/day: 0.50     Types: Cigarettes    Smokeless tobacco: Never   Substance Use Topics    Alcohol use: Yes     Comment: occasional    Drug use: Yes     Types: Marijuana     Comment: occasional       FAMILY HISTORY:  Family History   Problem Relation Age of Onset    Hypertension Mother         PROBLEM LIST:  There is no problem list on file for this patient.       PHYSICAL EXAM:      ED Triage Vitals [12/04/22 1252]   BP (!) 169/70   Pulse (!) 121   Resp 18   Temp 98.4 °F (36.9 °C)   SpO2 95 %        Vital Signs: Reviewed As In Chart.  General:  Alert, No Cardiorespiratory Distress Noted.   Skin: Normal For Ethnic Origin  Eye:  Extraocular Movements Are Intact.   ENT: Mucus membranes are moist.  No active nosebleed noted at this time, there is some erythema of the left nasal septum  Cardiovascular:  Regular Rate And Rhythm, No Murmur, No Pedal Edema.    Respiratory:  Respirations labored, mild Respiratory Distress, able to speak in 3-4 word sentences, decreased air entry bilaterally, rhonchi audible bilaterally  Musculoskeletal:  No Gross Deformity Noted.   Gastrointestinal:  Soft, Non Distended, Non Tenderness, Normal Bowel Sounds.    Neurological:  Alert And Oriented To Person, Place, Time, And Situation, Normal Motor Observed, Normal Speech Observed.    Psychiatric:  Cooperative, Appropriate Mood & Affect.    INITIAL IMPRESSION/ DIFFERENTIAL DX:      MEDICAL DECISION MAKING:      Reviewed Nurses Note. Reviewed Vital Signs.     Reviewed Pertinent old records, History and updated as necessary.    27 y.o. female with Shortness of Breath (C/o  continued cough & SOB today. Pt states that she is having another asthma attack. Pt was seen in ED last night )    Patient with history of asthma comes in with complaint of shortness of breath, wheezing at this time, rhonchi audible bilaterally will give her neb treatments repeat a CBC as her white count was [id number] last night, chest x-ray did not show any pneumonia,    ED WORKUP AND COURSE:  ED ORDERS:  Orders Placed This Encounter   Procedures    X-Ray Chest PA And Lateral    CBC auto differential    Comprehensive metabolic panel    CBC with Differential    APTT    Protime-INR    Inhalation Treatment Once       ED MEDICINES:  Medications   albuterol-ipratropium 2.5 mg-0.5 mg/3 mL nebulizer solution 3 mL (3 mLs Nebulization Given 12/4/22 1312)   budesonide nebulizer solution 0.5 mg (0.5 mg Nebulization Given 12/4/22 1312)   albuterol nebulizer solution 2.5 mg (2.5 mg Nebulization Given 12/4/22 1435)                ED LABS ORDERED AND REVIEWED:  Admission on 12/04/2022   Component Date Value Ref Range Status    Sodium Level 12/04/2022 143  136 - 145 mmol/L Final    Potassium Level 12/04/2022 3.4 (L)  3.5 - 5.1 mmol/L Final    Chloride 12/04/2022 111 (H)  98 - 107 mmol/L Final    Carbon Dioxide 12/04/2022 19 (L)  22 - 29 mmol/L Final    Glucose Level 12/04/2022 97  74 - 100 mg/dL Final    Blood Urea Nitrogen 12/04/2022 4.0 (L)  7.0 - 18.7 mg/dL Final    Creatinine 12/04/2022 0.71  0.55 - 1.02 mg/dL Final    Calcium Level Total 12/04/2022 9.4  8.4 - 10.2 mg/dL Final    Protein Total 12/04/2022 7.8  6.4 - 8.3 gm/dL Final    Albumin Level 12/04/2022 4.2  3.5 - 5.0 gm/dL Final    Globulin 12/04/2022 3.6 (H)  2.4 - 3.5 gm/dL Final    Albumin/Globulin Ratio 12/04/2022 1.2  1.1 - 2.0 ratio Final    Bilirubin Total 12/04/2022 0.2  <=1.5 mg/dL Final    Alkaline Phosphatase 12/04/2022 49  40 - 150 unit/L Final    Alanine Aminotransferase 12/04/2022 10  0 - 55 unit/L Final    Aspartate Aminotransferase 12/04/2022 17  5 - 34  unit/L Final    eGFR 12/04/2022 >60  mls/min/1.73/m2 Final    WBC 12/04/2022 17.8 (H)  4.5 - 11.5 x10(3)/mcL Final    RBC 12/04/2022 4.43  4.20 - 5.40 x10(6)/mcL Final    Hgb 12/04/2022 14.1  12.0 - 16.0 gm/dL Final    Hct 12/04/2022 41.6  37.0 - 47.0 % Final    MCV 12/04/2022 93.9  80.0 - 94.0 fL Final    MCH 12/04/2022 31.8 (H)  27.0 - 31.0 pg Final    MCHC 12/04/2022 33.9  33.0 - 36.0 mg/dL Final    RDW 12/04/2022 13.6  11.5 - 17.0 % Final    Platelet 12/04/2022 265  130 - 400 x10(3)/mcL Final    MPV 12/04/2022 10.5 (H)  7.4 - 10.4 fL Final    Neut % 12/04/2022 88.7  % Final    Lymph % 12/04/2022 5.4  % Final    Mono % 12/04/2022 5.2  % Final    Eos % 12/04/2022 0.0  % Final    Basophil % 12/04/2022 0.1  % Final    Lymph # 12/04/2022 0.97  0.6 - 4.6 x10(3)/mcL Final    Neut # 12/04/2022 15.8 (H)  2.1 - 9.2 x10(3)/mcL Final    Mono # 12/04/2022 0.92  0.1 - 1.3 x10(3)/mcL Final    Eos # 12/04/2022 0.00  0 - 0.9 x10(3)/mcL Final    Baso # 12/04/2022 0.02  0 - 0.2 x10(3)/mcL Final    IG# 12/04/2022 0.10 (H)  0 - 0.04 x10(3)/mcL Final    IG% 12/04/2022 0.6  % Final    PTT 12/04/2022 25.4  23.2 - 33.7 seconds Final    PT 12/04/2022 13.9  12.5 - 14.5 seconds Final    INR 12/04/2022 1.08  0.00 - 1.30 Final   Admission on 12/03/2022, Discharged on 12/04/2022   Component Date Value Ref Range Status    Influenza A PCR 12/03/2022 Not Detected  Not Detected Final    Influenza B PCR 12/03/2022 Not Detected  Not Detected Final    SARS-CoV-2 PCR 12/03/2022 Not Detected  Not Detected Final    Sodium Level 12/03/2022 141  136 - 145 mmol/L Final    Potassium Level 12/03/2022 3.2 (L)  3.5 - 5.1 mmol/L Final    Chloride 12/03/2022 110 (H)  98 - 107 mmol/L Final    Carbon Dioxide 12/03/2022 21 (L)  22 - 29 mmol/L Final    Glucose Level 12/03/2022 123 (H)  74 - 100 mg/dL Final    Blood Urea Nitrogen 12/03/2022 5.0 (L)  7.0 - 18.7 mg/dL Final    Creatinine 12/03/2022 0.81  0.55 - 1.02 mg/dL Final    Calcium Level Total 12/03/2022 9.5   8.4 - 10.2 mg/dL Final    Protein Total 12/03/2022 7.6  6.4 - 8.3 gm/dL Final    Albumin Level 12/03/2022 4.1  3.5 - 5.0 gm/dL Final    Globulin 12/03/2022 3.5  2.4 - 3.5 gm/dL Final    Albumin/Globulin Ratio 12/03/2022 1.2  1.1 - 2.0 ratio Final    Bilirubin Total 12/03/2022 0.2  <=1.5 mg/dL Final    Alkaline Phosphatase 12/03/2022 55  40 - 150 unit/L Final    Alanine Aminotransferase 12/03/2022 9  0 - 55 unit/L Final    Aspartate Aminotransferase 12/03/2022 17  5 - 34 unit/L Final    eGFR 12/03/2022 >60  mls/min/1.73/m2 Final    Beta hCG Qualitative, Urine 12/03/2022 Negative  Negative Final    Color, UA 12/03/2022 Yellow  Yellow, Light-Yellow, Dark Yellow, [place], Straw Final    Appearance, UA 12/03/2022 Cloudy (A)  Clear Final    Specific Gravity, UA 12/03/2022 >=1.030   Final    pH, UA 12/03/2022 5.5  5.0 - 8.5 Final    Protein, UA 12/03/2022 30 (A)  Negative mg/dL Final    Glucose, UA 12/03/2022 Negative  Negative, Normal mg/dL Final    Ketones, UA 12/03/2022 15 (A)  Negative mg/dL Final    Blood, UA 12/03/2022 Negative  Negative unit/L Final    Bilirubin, UA 12/03/2022 Small (A)  Negative mg/dL Final    Urobilinogen, UA 12/03/2022 0.2  0.2, 1.0, Normal mg/dL Final    Nitrites, UA 12/03/2022 Negative  Negative Final    Leukocyte Esterase, UA 12/03/2022 Small (A)  Negative unit/L Final    WBC 12/03/2022 21.6 (H)  4.5 - 11.5 x10(3)/mcL Final    RBC 12/03/2022 4.52  4.20 - 5.40 x10(6)/mcL Final    Hgb 12/03/2022 14.0  12.0 - 16.0 gm/dL Final    Hct 12/03/2022 42.0  37.0 - 47.0 % Final    MCV 12/03/2022 92.9  80.0 - 94.0 fL Final    MCH 12/03/2022 31.0  27.0 - 31.0 pg Final    MCHC 12/03/2022 33.3  33.0 - 36.0 mg/dL Final    RDW 12/03/2022 13.4  11.5 - 17.0 % Final    Platelet 12/03/2022 290  130 - 400 x10(3)/mcL Final    MPV 12/03/2022 10.5 (H)  7.4 - 10.4 fL Final    Neut % 12/03/2022 74.0  % Final    Lymph % 12/03/2022 16.8  % Final    Mono % 12/03/2022 7.1  % Final    Eos % 12/03/2022 1.2  % Final    Basophil %  12/03/2022 0.4  % Final    Lymph # 12/03/2022 3.63  0.6 - 4.6 x10(3)/mcL Final    Neut # 12/03/2022 16.0 (H)  2.1 - 9.2 x10(3)/mcL Final    Mono # 12/03/2022 1.54 (H)  0.1 - 1.3 x10(3)/mcL Final    Eos # 12/03/2022 0.26  0 - 0.9 x10(3)/mcL Final    Baso # 12/03/2022 0.09  0 - 0.2 x10(3)/mcL Final    IG# 12/03/2022 0.10 (H)  0 - 0.04 x10(3)/mcL Final    IG% 12/03/2022 0.5  % Final    Bacteria, UA 12/03/2022 Rare  None Seen, Rare, Occasional /HPF Final    Mucous, UA 12/03/2022 Small (A)  None Seen /LPF Final    Amporphous Urate Crystals, UA 12/03/2022 Few (A)  None Seen /HPF Final    Calcium Oxalate Crystals, UA 12/03/2022 Moderate (A)  None Seen /HPF Final    RBC, UA 12/03/2022 0-2  None Seen, 0-2, 3-5, 0-5 /HPF Final    WBC, UA 12/03/2022 3-5  None Seen, 0-2, 3-5, 0-5 /HPF Final    Squamous Epithelial Cells, UA 12/03/2022 Moderate (A)  None Seen, Rare, Occasional, Occ /HPF Final       RADIOLOGY STUDIES ORDERED AND REVIEWED:  Imaging Results              X-Ray Chest PA And Lateral (Final result)  Result time 12/04/22 15:40:34      Final result by Stanley Ulloa MD (12/04/22 15:40:34)                   Impression:      1. No evidence of acute cardiopulmonary abnormality.      Electronically signed by: Stanley Ulloa MD  Date:    12/04/2022  Time:    15:40               Narrative:    EXAMINATION:  XR CHEST PA AND LATERAL    CLINICAL HISTORY:  Shortness of breath    TECHNIQUE:  PA lateral views of the chest are obtained.    COMPARISON:  Chest radiograph from 08/20/2022    FINDINGS:  Heart size is normal.  There is no pulmonary edema.  No focal consolidation, effusion or pneumothorax.  But                                      ED COURSE AND REEVALUATIONS:  Vitals:    12/04/22 1435   BP:    Pulse: 98   Resp: 18   Temp:        PROCEDURES PERFORMED IN ED:  Procedures    ED Course as of 12/04/22 1624   Sun Dec 04, 2022   1413 Patient is feeling much better, she is not in any distress, she is not wheezing but on  auscultation she still has rhonchi bilaterally, I will give her another neb treatment, I will repeat a chest x-ray on her her white blood cell count is coming down, she is on steroids by mouth at home, she has albuterol at home but she only uses twice a day, I advised her that she should use it every 4 hours when she is sick and when she is doing okay she does not have to use it at all if not needed.  Patient verbalized understanding, she is not bleeding from the nose anymore her anxiety is much better at this time I will repeat a neb treatment, repeat chest x-ray and then decide for disposition. [GQ]   1617 Patient still does not have a pneumonia, I will go ahead and let her go home with instruction to take albuterol every 4 hours and continue steroids and see her family doctor for follow-up she does not have any other problem going on at this time her nosebleed has resolved. [GQ]      ED Course User Index  [GQ] Sandra Troy MD              DIAGNOSTIC IMPRESSION:      1. Intermittent asthma, unspecified asthma severity, unspecified whether complicated    2. Shortness of breath         ED Disposition Condition    Discharge Stable               Medication List        ASK your doctor about these medications      cefUROXime 500 MG tablet  Commonly known as: CEFTIN  Take 1 tablet (500 mg total) by mouth every 12 (twelve) hours. for 7 days     ibuprofen 400 MG tablet  Commonly known as: ADVIL,MOTRIN  Take 1 tablet (400 mg total) by mouth every 6 (six) hours as needed.     predniSONE 20 MG tablet  Commonly known as: DELTASONE  Take 2 tablets (40 mg total) by mouth once daily. for 5 days                Follow-up Information       Clif Mancilla MD In 2 days.    Specialty: Family Medicine  Contact information:  [address]  [place] [address]  [phone]                              ED Prescriptions    None       Follow-up Information       Follow up With Specialties Details Why Contact Info    Clif  HIGINIO Mancilla MD Family Medicine In 2 days  [address]  [address]  [place] [address]  [phone]                 Sandra Troy MD  12/04/22 6103

## 2022-12-04 NOTE — ED PROVIDER NOTES
Encounter Date: 12/3/2022       History     Chief Complaint   Patient presents with    Asthma     C/o asthma attack about 30 min PTA, inhaler and treatments given PTA with no relief, pt in distress, audible wheezing and tachypnea noted     HPI    27-year-old female with a past history of asthma anxiety depression presents emergency department for wheezing.  States it started 30 minutes prior to arrival.  States it happens often.  Patient states she took a home inhaler with no improvement.  Feel like she needs steroids.  No fever or productive cough.  States she felt fine earlier today.    Review of patient's allergies indicates:   Allergen Reactions    Peanut     Shellfish derived      Past Medical History:   Diagnosis Date    Anxiety disorder, unspecified     Asthma     Depression      Past Surgical History:   Procedure Laterality Date    ADENOIDECTOMY      CHOLECYSTECTOMY       Family History   Problem Relation Age of Onset    Hypertension Mother      Social History     Tobacco Use    Smoking status: Every Day     Packs/day: 0.50     Types: Cigarettes    Smokeless tobacco: Never   Substance Use Topics    Alcohol use: Yes     Comment: occasional    Drug use: Yes     Types: Marijuana     Comment: occasional     Review of Systems   Constitutional:  Negative for fever.   Respiratory:  Positive for cough, shortness of breath and wheezing.    Cardiovascular:  Negative for chest pain.   Gastrointestinal:  Negative for abdominal pain.   Genitourinary:  Positive for dysuria.   All other systems reviewed and are negative.    Physical Exam     Initial Vitals [12/03/22 2250]   BP Pulse Resp Temp SpO2   (!) 133/104 (!) 137 (!) 21 -- 97 %      MAP       --         Physical Exam    Nursing note and vitals reviewed.  Constitutional: She appears well-developed and well-nourished. She appears distressed.   Cardiovascular:  Regular rhythm.   Tachycardia present.         Pulmonary/Chest: Accessory muscle usage present. Tachypnea  noted. She is in respiratory distress. She has decreased breath sounds. She has wheezes.   Abdominal: Abdomen is soft. Bowel sounds are normal. There is no abdominal tenderness.   Musculoskeletal:         General: No tenderness. Normal range of motion.     Neurological: She is alert and oriented to person, place, and time.   Skin: Skin is warm. Capillary refill takes less than 2 seconds.   Psychiatric: She has a normal mood and affect. Thought content normal.       ED Course   Procedures  Labs Reviewed   COMPREHENSIVE METABOLIC PANEL - Abnormal; Notable for the following components:       Result Value    Potassium Level 3.2 (*)     Chloride 110 (*)     Carbon Dioxide 21 (*)     Glucose Level 123 (*)     Blood Urea Nitrogen 5.0 (*)     All other components within normal limits   URINALYSIS, REFLEX TO URINE CULTURE - Abnormal; Notable for the following components:    Appearance, UA Cloudy (*)     Protein, UA 30 (*)     Ketones, UA 15 (*)     Bilirubin, UA Small (*)     Leukocyte Esterase, UA Small (*)     All other components within normal limits   CBC WITH DIFFERENTIAL - Abnormal; Notable for the following components:    WBC 21.6 (*)     MPV 10.5 (*)     Neut # 16.0 (*)     Mono # 1.54 (*)     IG# 0.10 (*)     All other components within normal limits   URINALYSIS, MICROSCOPIC - Abnormal; Notable for the following components:    Mucous, UA Small (*)     Amporphous Urate Crystals, UA Few (*)     Calcium Oxalate Crystals, UA Moderate (*)     Squamous Epithelial Cells, UA Moderate (*)     All other components within normal limits   COVID/FLU A&B PCR - Normal    Narrative:     The Xpert Xpress SARS-CoV-2/FLU/RSV plus is a rapid, multiplexed real-time PCR test intended for the simultaneous qualitative detection and differentiation of SARS-CoV-2, Influenza A, Influenza B, and respiratory syncytial virus (RSV) viral RNA in either nasopharyngeal swab or nasal swab specimens.         PREGNANCY TEST, URINE RAPID - Normal   CBC  W/ AUTO DIFFERENTIAL    Narrative:     The following orders were created for panel order CBC auto differential.  Procedure                               Abnormality         Status                     ---------                               -----------         ------                     CBC with Differential[[id number]]        Abnormal            Final result                 Please view results for these tests on the individual orders.          Imaging Results              X-Ray Chest 1 View (Preliminary result)  Result time 12/04/22 00:20:01      ED Interpretation by Romain Biggs MD (12/04/22 00:20:01, [facility] - Emergency Dept, Emergency Medicine)    Lungs are clear with no consolidations                                     Medications   lactated ringers infusion (has no administration in time range)   albuterol-ipratropium 2.5 mg-0.5 mg/3 mL nebulizer solution 3 mL (3 mLs Nebulization Given 12/3/22 2258)   methylPREDNISolone sodium succinate injection 125 mg (125 mg Intravenous Given 12/3/22 2318)   lactated ringers bolus 1,000 mL (1,000 mLs Intravenous New Bag 12/3/22 2313)   albuterol-ipratropium 2.5 mg-0.5 mg/3 mL nebulizer solution 3 mL (3 mLs Nebulization Given 12/4/22 0024)     Medical Decision Making:   Differential Diagnosis:   Asthma, viral syndrome           ED Course as of 12/04/22 0047   Sun Dec 04, 2022   0010 Wheezing improved but still persists.  Will give another DuoNeb [BS]   0043 Patient's wheezing completely under control.  White blood cell count elevated although possible small UTI.  Chest x-ray clear.  We will give few doses of steroids at home.  She has albuterol at home. [BS]      ED Course User Index  [BS] Romain Biggs MD                 Clinical Impression:   Final diagnoses:  [R06.2] Wheezing  [J45.901] Exacerbation of asthma, unspecified asthma severity, unspecified whether persistent (Primary)  [N30.00] Acute cystitis without hematuria        ED Disposition  Condition    Discharge Stable          ED Prescriptions       Medication Sig Dispense Start Date End Date Auth. Provider    predniSONE (DELTASONE) 20 MG tablet Take 2 tablets (40 mg total) by mouth once daily. for 5 days 10 tablet 12/4/2022 12/9/2022 Romain Biggs MD    cefUROXime (CEFTIN) 500 MG tablet Take 1 tablet (500 mg total) by mouth every 12 (twelve) hours. for 7 days 14 tablet 12/4/2022 12/11/2022 Romain Biggs MD          Follow-up Information       Follow up With Specialties Details Why Contact Info    Clif Mancilla MD Family Medicine Schedule an appointment as soon as possible for a visit   [address]  [facility] [address]  [phone]      [facility] - Emergency Dept Emergency Medicine Go to  If symptoms worsen [address]  [facility] [id number] [phone]             Romain Biggs MD  12/04/22 0047

## 2023-03-26 ENCOUNTER — HOSPITAL ENCOUNTER (EMERGENCY)
Facility: HOSPITAL | Age: 28
Discharge: HOME | End: 2023-03-26
Attending: INTERNAL MEDICINE
Payer: MEDICAID

## 2023-03-26 VITALS
OXYGEN SATURATION: 100 % | TEMPERATURE: 98 F | WEIGHT: 181 LBS | HEART RATE: 63 BPM | DIASTOLIC BLOOD PRESSURE: 79 MMHG | HEIGHT: 65 IN | BODY MASS INDEX: 30.16 KG/M2 | RESPIRATION RATE: 20 BRPM | SYSTOLIC BLOOD PRESSURE: 119 MMHG

## 2023-03-26 DIAGNOSIS — F41.9 ANXIETY: Primary | ICD-10-CM

## 2023-03-26 PROBLEM — J45.909 ASTHMA: Status: ACTIVE | Noted: 2021-12-04

## 2023-03-26 PROBLEM — E66.9 OBESITY: Status: ACTIVE | Noted: 2023-03-26

## 2023-03-26 PROBLEM — K21.9 GASTROESOPHAGEAL REFLUX DISEASE: Status: ACTIVE | Noted: 2023-03-26

## 2023-03-26 PROBLEM — N83.209 CYST OF OVARY: Status: ACTIVE | Noted: 2023-03-26

## 2023-03-26 PROBLEM — K29.70 GASTRITIS: Status: ACTIVE | Noted: 2023-03-26

## 2023-03-26 PROCEDURE — 25000003 PHARM REV CODE 250: Performed by: NURSE PRACTITIONER

## 2023-03-26 PROCEDURE — 99283 EMERGENCY DEPT VISIT LOW MDM: CPT

## 2023-03-26 RX ORDER — LORAZEPAM 0.5 MG/1
0.5 TABLET ORAL
Status: COMPLETED | OUTPATIENT
Start: 2023-03-26 | End: 2023-03-26

## 2023-03-26 RX ORDER — LEVOMEFOLATE MAGNESIUM, LEUCOVORIN, FOLIC ACID, FERROUS CYSTEINE GLYCINATE, MAGNESIUM ASCORBATE, ZINC ASCORBATE, COCARBOXYLASE, FLAVIN ADENINE DINUCLEOTIDE, NADH, PYRIDOXAL PHOSPHATE ANHYDROUS, COBAMAMIDE, BETAINE, MAGNESIUM L-THREONATE, 1,2-DOCOSAHEXANOYL-SN-GLYCERO-3-PHOSPHOSERINE CALCIUM, 1,2-ICOSAPENTOYL-SN-GLYCERO-3-PHOSPHOSERINE CALCIUM, AND PHOSPHATIDYL SERINE 5.23; 2.5; 1; 13.6; 24; 1; 25; 25; 25; 25; 50; 500; 1; 6.4; 800; 12 MG/1; MG/1; MG/1; MG/1; MG/1; MG/1; UG/1; UG/1; UG/1; UG/1; UG/1; UG/1; MG/1; MG/1; UG/1; MG/1
1 CAPSULE, DELAYED RELEASE PELLETS ORAL DAILY
Qty: 30 CAPSULE | Refills: 5 | Status: SHIPPED | OUTPATIENT
Start: 2023-03-26 | End: 2023-04-25

## 2023-03-26 RX ADMIN — LORAZEPAM 0.5 MG: 0.5 TABLET ORAL at 04:03

## 2023-03-26 NOTE — ED PROVIDER NOTES
Encounter Date: 3/26/2023       History     Chief Complaint   Patient presents with    Anxiety     Panic attack    feeling dizzy    also having nose bleeds for 2 weeks     Patient is a 28-year-old female with significant history of anxiety disorder, asthma, depression, ovarian cysts who presents to the emergency department today having multiple panic attacks at work, she is currently under the care of Dr. Mancilla who prescribes her Vistaril, she states that she has been taking her Vistaril for her anxiety became so severe today that she started to hyperventilate with carpal pedal spasms, subsequent headache.  She states that she continues to have a headache and that her skin is still tingling but she is no longer hyperventilating at this time, she still states that she feels very anxious she is speaking very quickly, denies any depression, denies any HI or SI.  Also states that she is currently under the care Dr. Lehman, because she is having difficulty conceiving.  She states that she understands that we cannot provide a long-term solution for her in here but she would benefit from a low-dose anything that improves or anxiety for the time being.  She denies any chest pain, shortness of breath has resolved, denies any nausea, denies any abdominal pain, her dizziness has resolved.  No other associated complaints or conditions.    Review of patient's allergies indicates:   Allergen Reactions    Peanut     Shellfish derived      Past Medical History:   Diagnosis Date    Anxiety disorder, unspecified     Asthma     Depression      Past Surgical History:   Procedure Laterality Date    ADENOIDECTOMY      CHOLECYSTECTOMY       Family History   Problem Relation Age of Onset    Hypertension Mother      Social History     Tobacco Use    Smoking status: Every Day     Packs/day: 0.50     Types: Cigarettes    Smokeless tobacco: Never   Substance Use Topics    Alcohol use: Yes     Comment: occasional    Drug use: Yes     Types:  Marijuana     Comment: occasional     Review of Systems   All other systems reviewed and are negative.    Physical Exam     Initial Vitals [03/26/23 1549]   BP Pulse Resp Temp SpO2   119/79 63 20 97.5 °F (36.4 °C) 100 %      MAP       --         Physical Exam    Nursing note and vitals reviewed.  Constitutional: She appears well-developed and well-nourished.   HENT:   Head: Normocephalic and atraumatic.   Eyes: Conjunctivae are normal. Pupils are equal, round, and reactive to light.   Neck: Neck supple.   Normal range of motion.  Cardiovascular:  Normal rate, regular rhythm and normal heart sounds.           Pulmonary/Chest: Breath sounds normal.   Abdominal: Abdomen is soft.   Musculoskeletal:         General: Normal range of motion.      Cervical back: Normal range of motion and neck supple.     Neurological: She is alert and oriented to person, place, and time. She has normal strength. GCS score is 15. GCS eye subscore is 4. GCS verbal subscore is 5. GCS motor subscore is 6.   Skin: Skin is warm and dry.   Psychiatric: Her behavior is normal. Judgment and thought content normal. Her mood appears anxious. Her speech is rapid and/or pressured.       ED Course   Procedures  Labs Reviewed   POCT GLUCOSE, HAND-HELD DEVICE          Imaging Results    None          Medications   LORazepam tablet 0.5 mg (0.5 mg Oral Given 3/26/23 1618)                 ED Course as of 03/26/23 1624   Sun Mar 26, 2023   1617 She states that she had 2 doses of hydroxyzine prior to arrival, I explained to her that there are many different treatment modalities for the management of anxiety none of which can be facilitated in the ER because the lack of continuity of care, I will administer 1/2 a mg of Ativan while she is in here with instructions to follow-up with Dr. Mancilla this week.  She states that again she has good continuity of care and will make an appointment with him this week to discuss other treatment options.  Reassurance given to  the patient she was very appreciative.  She is here with her mother today and her mother states that she will ensure follow-up.  I will discharge her at this time. [EB]      ED Course User Index  [EB] SALVATORE Chao                 Clinical Impression:   Final diagnoses:  [F41.9] Anxiety (Primary)        ED Disposition Condition    Discharge Stable          ED Prescriptions       Medication Sig Dispense Start Date End Date Auth. Provider    ENBRACE HR 1.5 mg iron- 8.73 mg-6.4 mg capsule Take 1 capsule by mouth once daily. 30 capsule 3/26/2023 4/25/2023 SALVATORE Chao          Follow-up Information       Follow up With Specialties Details Why Contact Info    Clif Mancilla MD Family Medicine In 2 days  [address]  [address]  [place] [address]  [phone]               SALVATORE Chao  03/26/23 0789

## 2023-04-06 DIAGNOSIS — N92.0 EXCESSIVE OR FREQUENT MENSTRUATION: Primary | ICD-10-CM

## 2023-04-19 ENCOUNTER — HOSPITAL ENCOUNTER (OUTPATIENT)
Dept: RADIOLOGY | Facility: HOSPITAL | Age: 28
Discharge: HOME | End: 2023-04-19
Attending: FAMILY MEDICINE
Payer: MEDICAID

## 2023-04-19 DIAGNOSIS — N92.0 EXCESSIVE OR FREQUENT MENSTRUATION: ICD-10-CM

## 2023-04-19 PROCEDURE — 76856 US EXAM PELVIC COMPLETE: CPT | Mod: TC

## 2023-06-24 ENCOUNTER — HOSPITAL ENCOUNTER (EMERGENCY)
Facility: HOSPITAL | Age: 28
Discharge: LEFT BEFORE DISCHARGE | End: 2023-06-24
Attending: EMERGENCY MEDICINE | Admitting: NURSE PRACTITIONER
Payer: MEDICAID

## 2023-06-24 VITALS
HEART RATE: 85 BPM | WEIGHT: 177.63 LBS | RESPIRATION RATE: 18 BRPM | BODY MASS INDEX: 30.32 KG/M2 | OXYGEN SATURATION: 99 % | HEIGHT: 64 IN | SYSTOLIC BLOOD PRESSURE: 114 MMHG | DIASTOLIC BLOOD PRESSURE: 82 MMHG | TEMPERATURE: 98 F

## 2023-06-24 DIAGNOSIS — R10.32 LEFT LOWER QUADRANT ABDOMINAL PAIN: Primary | ICD-10-CM

## 2023-06-24 LAB
APPEARANCE UR: ABNORMAL
B-HCG SERPL QL: NEGATIVE
BACTERIA #/AREA URNS AUTO: ABNORMAL /HPF
BILIRUB UR QL STRIP.AUTO: NEGATIVE MG/DL
COLOR UR: YELLOW
GLUCOSE UR QL STRIP.AUTO: NEGATIVE MG/DL
KETONES UR QL STRIP.AUTO: NEGATIVE MG/DL
LEUKOCYTE ESTERASE UR QL STRIP.AUTO: ABNORMAL UNIT/L
NITRITE UR QL STRIP.AUTO: NEGATIVE
PH UR STRIP.AUTO: 6.5 [PH]
PROT UR QL STRIP.AUTO: NEGATIVE MG/DL
RBC #/AREA URNS AUTO: ABNORMAL /HPF
RBC UR QL AUTO: NEGATIVE UNIT/L
SP GR UR STRIP.AUTO: 1.02
SQUAMOUS #/AREA URNS AUTO: ABNORMAL /HPF
UROBILINOGEN UR STRIP-ACNC: 0.2 MG/DL
WBC #/AREA URNS AUTO: ABNORMAL /HPF

## 2023-06-24 PROCEDURE — 81025 URINE PREGNANCY TEST: CPT | Performed by: EMERGENCY MEDICINE

## 2023-06-24 PROCEDURE — 81001 URINALYSIS AUTO W/SCOPE: CPT | Performed by: EMERGENCY MEDICINE

## 2023-06-24 PROCEDURE — 99282 EMERGENCY DEPT VISIT SF MDM: CPT

## 2023-06-24 RX ORDER — HYDROXYZINE HYDROCHLORIDE 25 MG/1
25 TABLET, FILM COATED ORAL EVERY 8 HOURS PRN
COMMUNITY
Start: 2023-03-07

## 2023-06-24 NOTE — ED NOTES
Pt states that she has to return the car the she used to get here and must leave. Pt states that she will return later today. Pt signed AMA form

## 2023-06-24 NOTE — ED PROVIDER NOTES
Encounter Date: 6/24/2023       History     Chief Complaint   Patient presents with    Abdominal Pain     Pt c/o left lower quadrant pain starting yesterday. States had vag bleeding yesterday after being 7 days late for menstrual period but is not bleeding now. Negative home pregnancy test.     Patient is a 28-year-old female with significant history of suspected ovarian cyst who presents to the emergency department today for evaluation and treatment of severe left lower quadrant pain.  She states that she has been seen for this condition in the ED in the past, states that her physician is Dr. Mancilla and she had a complete PCOS workup there and was told that she did not have ovarian cyst.  She states that she has been trying the get pregnant for while now, states that she her menses was 1 week late and that she started yesterday, states that her menses was the character that would have been more so when she is at the end of her cycle rather than the beginning.  States that she has had diarrhea consistently for the past 3 days, states that she started to suffer some left lower quadrant abdominal pain, characterized as severe earlier today.  When the pain started that is when she decided to present to the emergency department for treatment.  She states that she is nauseated but she has not vomited, she denies any chest pain, she denies any shortness of breath, she is a good historian.  She is afebrile, her vital signs are stable, she is not tachycardic, she appears uncomfortable but not toxic.    Review of patient's allergies indicates:   Allergen Reactions    Peanut     Shellfish derived      Past Medical History:   Diagnosis Date    Anxiety disorder, unspecified     Asthma     Depression      Past Surgical History:   Procedure Laterality Date    ADENOIDECTOMY      CHOLECYSTECTOMY       Family History   Problem Relation Age of Onset    Hypertension Mother      Social History     Tobacco Use    Smoking status: Every  Day     Packs/day: 0.50     Types: Cigarettes    Smokeless tobacco: Never   Substance Use Topics    Alcohol use: Yes     Comment: occasional    Drug use: Yes     Types: Marijuana     Comment: occasional     Review of Systems   All other systems reviewed and are negative.    Physical Exam     Initial Vitals [06/24/23 1208]   BP Pulse Resp Temp SpO2   120/82 97 18 97.6 °F (36.4 °C) 99 %      MAP       --         Physical Exam    Nursing note and vitals reviewed.  Constitutional: She appears well-developed and well-nourished.   HENT:   Head: Normocephalic and atraumatic.   Eyes: Conjunctivae are normal. Pupils are equal, round, and reactive to light.   Neck: Neck supple.   Normal range of motion.  Cardiovascular:  Normal rate and regular rhythm.           Pulmonary/Chest: Breath sounds normal.   Abdominal: Abdomen is soft and flat. Bowel sounds are normal. There is abdominal tenderness in the right upper quadrant, suprapubic area and left lower quadrant. No hernia. There is rebound and guarding. There is no tenderness at McBurney's point and negative Staples's sign.   Musculoskeletal:         General: Normal range of motion.      Cervical back: Normal range of motion and neck supple.     Neurological: She is alert and oriented to person, place, and time. She has normal strength. GCS score is 15. GCS eye subscore is 4. GCS verbal subscore is 5. GCS motor subscore is 6.   Skin: Skin is warm and dry.   Psychiatric: She has a normal mood and affect. Her behavior is normal. Judgment and thought content normal.       ED Course   Procedures  Labs Reviewed   URINALYSIS, REFLEX TO URINE CULTURE - Abnormal; Notable for the following components:       Result Value    Appearance, UA Slightly Cloudy (*)     Leukocyte Esterase, UA Small (*)     All other components within normal limits   URINALYSIS, MICROSCOPIC - Abnormal; Notable for the following components:    Bacteria, UA Few (*)     Squamous Epithelial Cells, UA Moderate (*)      All other components within normal limits   HCG QUALITATIVE URINE - Normal   CBC W/ AUTO DIFFERENTIAL    Narrative:     The following orders were created for panel order CBC auto differential.  Procedure                               Abnormality         Status                     ---------                               -----------         ------                     CBC with Differential[[id number]]                                                         Please view results for these tests on the individual orders.   COMPREHENSIVE METABOLIC PANEL   CBC WITH DIFFERENTIAL          Imaging Results    None          Medications   lactated ringers bolus 1,000 mL (has no administration in time range)                 ED Course as of 06/24/23 1346   Sat Jun 24, 2023   1343 A physical examination was performed, H&P were obtained along with an HPI, while her workup was being initiated, she tells the nurse that she borrowed a car and that the people that she borrowed the vehicle from told her that they needed the car back so she decided to sign out AMA and states that she will return to the ED.  She is aware of the risk associated with signing out AMA. [EB]      ED Course User Index  [EB] SALVATORE Chao                 Clinical Impression:   Final diagnoses:  [R10.32] Left lower quadrant abdominal pain (Primary)        ED Disposition Condition    SALVATORE Shin  06/24/23 1343

## 2023-06-24 NOTE — ED NOTES
Pt ambulated to ed rm 5 from triage. Aaox4. Pt reports llq pain since  yesterday. Also reports that she was 7 days late on her cycle then started bleeding yesterday but has stopped today. Had vomiting and diarrhea 2 days ago but none now. Denies fever. In no distress. On monitors. Wctm

## 2024-01-03 ENCOUNTER — HOSPITAL ENCOUNTER (EMERGENCY)
Facility: HOSPITAL | Age: 29
Discharge: HOME | End: 2024-01-03
Attending: INTERNAL MEDICINE
Payer: MEDICAID

## 2024-01-03 VITALS
HEART RATE: 80 BPM | TEMPERATURE: 97 F | RESPIRATION RATE: 18 BRPM | BODY MASS INDEX: 30.73 KG/M2 | DIASTOLIC BLOOD PRESSURE: 76 MMHG | SYSTOLIC BLOOD PRESSURE: 126 MMHG | WEIGHT: 180 LBS | OXYGEN SATURATION: 100 % | HEIGHT: 64 IN

## 2024-01-03 DIAGNOSIS — Z3A.01 LESS THAN 8 WEEKS GESTATION OF PREGNANCY: ICD-10-CM

## 2024-01-03 DIAGNOSIS — R11.2 NAUSEA AND VOMITING, UNSPECIFIED VOMITING TYPE: Primary | ICD-10-CM

## 2024-01-03 DIAGNOSIS — K92.0 HEMATEMESIS OF UNKNOWN ETIOLOGY: ICD-10-CM

## 2024-01-03 LAB
ALBUMIN SERPL-MCNC: 4.2 G/DL (ref 3.5–5)
ALBUMIN/GLOB SERPL: 1.3 RATIO (ref 1.1–2)
ALP SERPL-CCNC: 41 UNIT/L (ref 40–150)
ALT SERPL-CCNC: 11 UNIT/L (ref 0–55)
APPEARANCE UR: CLEAR
AST SERPL-CCNC: 19 UNIT/L (ref 5–34)
BASOPHILS # BLD AUTO: 0.06 X10(3)/MCL
BASOPHILS NFR BLD AUTO: 0.6 %
BILIRUB SERPL-MCNC: 0.5 MG/DL
BILIRUB UR QL STRIP.AUTO: NEGATIVE
BUN SERPL-MCNC: 6 MG/DL (ref 7–18.7)
CALCIUM SERPL-MCNC: 9.3 MG/DL (ref 8.4–10.2)
CHLORIDE SERPL-SCNC: 105 MMOL/L (ref 98–107)
CO2 SERPL-SCNC: 23 MMOL/L (ref 22–29)
COLOR UR AUTO: YELLOW
CREAT SERPL-MCNC: 0.72 MG/DL (ref 0.55–1.02)
EOSINOPHIL # BLD AUTO: 0.16 X10(3)/MCL (ref 0–0.9)
EOSINOPHIL NFR BLD AUTO: 1.6 %
ERYTHROCYTE [DISTWIDTH] IN BLOOD BY AUTOMATED COUNT: 12.1 % (ref 11.5–17)
GFR SERPLBLD CREATININE-BSD FMLA CKD-EPI: >60 MLS/MIN/1.73/M2
GLOBULIN SER-MCNC: 3.3 GM/DL (ref 2.4–3.5)
GLUCOSE SERPL-MCNC: 80 MG/DL (ref 74–100)
GLUCOSE UR QL STRIP.AUTO: NEGATIVE
HCT VFR BLD AUTO: 38 % (ref 37–47)
HGB BLD-MCNC: 12.9 G/DL (ref 12–16)
IMM GRANULOCYTES # BLD AUTO: 0.05 X10(3)/MCL (ref 0–0.04)
IMM GRANULOCYTES NFR BLD AUTO: 0.5 %
INR PPP: 1
KETONES UR QL STRIP.AUTO: NEGATIVE
LEUKOCYTE ESTERASE UR QL STRIP.AUTO: NEGATIVE
LIPASE SERPL-CCNC: 14 U/L
LYMPHOCYTES # BLD AUTO: 1.96 X10(3)/MCL (ref 0.6–4.6)
LYMPHOCYTES NFR BLD AUTO: 20 %
MAGNESIUM SERPL-MCNC: 2.1 MG/DL (ref 1.6–2.6)
MCH RBC QN AUTO: 31.9 PG (ref 27–31)
MCHC RBC AUTO-ENTMCNC: 33.9 G/DL (ref 33–36)
MCV RBC AUTO: 93.8 FL (ref 80–94)
MONOCYTES # BLD AUTO: 0.65 X10(3)/MCL (ref 0.1–1.3)
MONOCYTES NFR BLD AUTO: 6.6 %
NEUTROPHILS # BLD AUTO: 6.93 X10(3)/MCL (ref 2.1–9.2)
NEUTROPHILS NFR BLD AUTO: 70.7 %
NITRITE UR QL STRIP.AUTO: NEGATIVE
PH UR STRIP.AUTO: 8.5 [PH]
PLATELET # BLD AUTO: 210 X10(3)/MCL (ref 130–400)
PMV BLD AUTO: 10.5 FL (ref 7.4–10.4)
POTASSIUM SERPL-SCNC: 3.7 MMOL/L (ref 3.5–5.1)
PROT SERPL-MCNC: 7.5 GM/DL (ref 6.4–8.3)
PROT UR QL STRIP.AUTO: NEGATIVE
PROTHROMBIN TIME: 13.2 SECONDS (ref 12.5–14.5)
RBC # BLD AUTO: 4.05 X10(6)/MCL (ref 4.2–5.4)
RBC UR QL AUTO: NEGATIVE
SODIUM SERPL-SCNC: 136 MMOL/L (ref 136–145)
SP GR UR STRIP.AUTO: 1.01 (ref 1–1.03)
UROBILINOGEN UR STRIP-ACNC: 0.2
WBC # SPEC AUTO: 9.81 X10(3)/MCL (ref 4.5–11.5)

## 2024-01-03 PROCEDURE — 85025 COMPLETE CBC W/AUTO DIFF WBC: CPT | Performed by: INTERNAL MEDICINE

## 2024-01-03 PROCEDURE — 83690 ASSAY OF LIPASE: CPT | Performed by: INTERNAL MEDICINE

## 2024-01-03 PROCEDURE — 25000003 PHARM REV CODE 250: Performed by: INTERNAL MEDICINE

## 2024-01-03 PROCEDURE — 83735 ASSAY OF MAGNESIUM: CPT | Performed by: INTERNAL MEDICINE

## 2024-01-03 PROCEDURE — 81003 URINALYSIS AUTO W/O SCOPE: CPT | Performed by: INTERNAL MEDICINE

## 2024-01-03 PROCEDURE — 85610 PROTHROMBIN TIME: CPT | Performed by: INTERNAL MEDICINE

## 2024-01-03 PROCEDURE — 63600175 PHARM REV CODE 636 W HCPCS: Performed by: INTERNAL MEDICINE

## 2024-01-03 PROCEDURE — 80053 COMPREHEN METABOLIC PANEL: CPT | Performed by: INTERNAL MEDICINE

## 2024-01-03 PROCEDURE — 99284 EMERGENCY DEPT VISIT MOD MDM: CPT | Mod: 25

## 2024-01-03 PROCEDURE — 96374 THER/PROPH/DIAG INJ IV PUSH: CPT

## 2024-01-03 PROCEDURE — 96361 HYDRATE IV INFUSION ADD-ON: CPT

## 2024-01-03 RX ORDER — ONDANSETRON HYDROCHLORIDE 2 MG/ML
4 INJECTION, SOLUTION INTRAVENOUS
Status: COMPLETED | OUTPATIENT
Start: 2024-01-03 | End: 2024-01-03

## 2024-01-03 RX ORDER — ONDANSETRON HYDROCHLORIDE 8 MG/1
8 TABLET, FILM COATED ORAL EVERY 8 HOURS PRN
Qty: 12 TABLET | Refills: 0 | Status: SHIPPED | OUTPATIENT
Start: 2024-01-03

## 2024-01-03 RX ADMIN — ONDANSETRON 4 MG: 2 INJECTION INTRAMUSCULAR; INTRAVENOUS at 02:01

## 2024-01-03 RX ADMIN — SODIUM CHLORIDE 1000 ML: 9 INJECTION, SOLUTION INTRAVENOUS at 02:01

## 2024-01-03 NOTE — ED PROVIDER NOTES
01/03/2024         2:27 PM    Source of History:  History obtained from patient and significant other .     Chief complaint:  From Nurse Triage:  Emesis (Pt states she has been constipated x6 days and called her OB Dr. Lafleur who told her to take milk of magnesia and magnesium citrate. Pt states shortly after she began vomiting bright red blood. Pt c/o facial numbness. Pt has red rash to face which family member says started after vomiting began. Reports she is pregnant, LMP 10/21/23.)    HISTORY OF PRESENT ILLNES:  Natalie Ibarra is a 28 y.o. female  has a past medical history of Anxiety disorder, unspecified, Asthma, and Depression. presenting with Emesis (Pt states she has been constipated x6 days and called her OB Dr. Lafleur who told her to take milk of magnesia and magnesium citrate. Pt states shortly after she began vomiting bright red blood. Pt c/o facial numbness. Pt has red rash to face which family member says started after vomiting began. Reports she is pregnant, LMP 10/21/23.)      REVIEW OF SYSTEMS:   Constitutional symptoms:     Skin symptoms:      Eye symptoms:     ENMT symptoms:      Respiratory symptoms:      Cardiovascular symptoms:     Gastrointestinal symptoms:      Genitourinary symptoms:     Musculoskeletal symptoms:      Neurologic symptoms:      Psychiatric symptoms:               Additional review of systems information: Patient Denies Any Other Complaints.    All Other Systems Reviewed With Patient And Negative.    ALLEGIES:  Review of patient's allergies indicates:   Allergen Reactions    Peanut     Shellfish derived        MEDICINE LIST:  Current Outpatient Medications   Medication Instructions    hydrOXYzine HCL (ATARAX) 25 mg, Oral, Every 8 hours PRN    ibuprofen (ADVIL,MOTRIN) 400 mg, Oral, Every 6 hours PRN    ondansetron (ZOFRAN) 8 mg, Oral, Every 8 hours PRN        PMH:  As per HPI and below:    Reviewed and updated in chart.    PAST MEDICAL HISTORY:  Past Medical History:    Diagnosis Date    Anxiety disorder, unspecified     Asthma     Depression         PAST SURGICAL HISTORY:  Past Surgical History:   Procedure Laterality Date    ADENOIDECTOMY      CHOLECYSTECTOMY         SOCIAL HISTORY:  Social History     Tobacco Use    Smoking status: Every Day     Current packs/day: 0.50     Types: Cigarettes    Smokeless tobacco: Never   Substance Use Topics    Alcohol use: Yes     Comment: occasional    Drug use: Yes     Types: Marijuana     Comment: occasional       FAMILY HISTORY:  Family History   Problem Relation Age of Onset    Hypertension Mother         PROBLEM LIST:  Patient Active Problem List   Diagnosis    Gastritis    Asthma    Cyst of ovary    Obesity    Gastroesophageal reflux disease    Anxiety        PHYSICAL EXAM:      ED Triage Vitals [01/03/24 1417]   /76   Pulse 80   Resp 18   Temp 97.1 °F (36.2 °C)   SpO2 100 %        Vital Signs: Reviewed As In Chart.  General:  Alert, No Cardiorespiratory Distress Noted.  Head: Normocephalic   Eye:  Pupils equal and reactive to light and accomodation. Extraocular Movements Are Intact.   ENT: Mucus membranes are moist.   Neck: Supple  Cardiovascular:  Regular Rate And Rhythm     Respiratory:  Clear to auscultation bilaterally    Gastrointestinal:  Soft, Non Distended, mild epigastric tenderness Normal Bowel Sounds.    Neurological:  Alert And Oriented To Person, Place, Time, And Situation, Normal Motor Observed, Normal Speech Observed.  Back: Normal range of motion  Musculoskeletal:  No Gross Deformity Noted.     Psychiatric:  Cooperative.  Skin: warm, dry petechial hemorrhages on face, hyperpigmentation to lower extremities (patient states this has been present since childhood)  Lymphatic: No lymphadenopathy      ED WORKUP FOR MEDICAL DECISION MAKING:    ED ORDERS:  Orders Placed This Encounter   Procedures    CBC auto differential    Comprehensive metabolic panel    Lipase    Magnesium    Protime-INR    CBC with Differential     Urinalysis, Reflex to Urine Culture    Cardiac Monitoring - Adult    Insert Saline lock IV       ED MEDICINES:  Medications   sodium chloride 0.9% bolus 1,000 mL 1,000 mL (1,000 mLs Intravenous New Bag 1/3/24 1444)   ondansetron injection 4 mg (4 mg Intravenous Given 1/3/24 1444)                ED LABS ORDERED AND REVIEWED:  Admission on 01/03/2024   Component Date Value Ref Range Status    Sodium Level 01/03/2024 136  136 - 145 mmol/L Final    Potassium Level 01/03/2024 3.7  3.5 - 5.1 mmol/L Final    Chloride 01/03/2024 105  98 - 107 mmol/L Final    Carbon Dioxide 01/03/2024 23  22 - 29 mmol/L Final    Glucose Level 01/03/2024 80  74 - 100 mg/dL Final    Blood Urea Nitrogen 01/03/2024 6.0 (L)  7.0 - 18.7 mg/dL Final    Creatinine 01/03/2024 0.72  0.55 - 1.02 mg/dL Final    Calcium Level Total 01/03/2024 9.3  8.4 - 10.2 mg/dL Final    Protein Total 01/03/2024 7.5  6.4 - 8.3 gm/dL Final    Albumin Level 01/03/2024 4.2  3.5 - 5.0 g/dL Final    Globulin 01/03/2024 3.3  2.4 - 3.5 gm/dL Final    Albumin/Globulin Ratio 01/03/2024 1.3  1.1 - 2.0 ratio Final    Bilirubin Total 01/03/2024 0.5  <=1.5 mg/dL Final    Alkaline Phosphatase 01/03/2024 41  40 - 150 unit/L Final    Alanine Aminotransferase 01/03/2024 11  0 - 55 unit/L Final    Aspartate Aminotransferase 01/03/2024 19  5 - 34 unit/L Final    eGFR 01/03/2024 >60  mls/min/1.73/m2 Final    Lipase Level 01/03/2024 14  <=60 U/L Final    Magnesium Level 01/03/2024 2.10  1.60 - 2.60 mg/dL Final    PT 01/03/2024 13.2  12.5 - 14.5 seconds Final    INR 01/03/2024 1.0  <=1.3 Final    WBC 01/03/2024 9.81  4.50 - 11.50 x10(3)/mcL Final    RBC 01/03/2024 4.05 (L)  4.20 - 5.40 x10(6)/mcL Final    Hgb 01/03/2024 12.9  12.0 - 16.0 g/dL Final    Hct 01/03/2024 38.0  37.0 - 47.0 % Final    MCV 01/03/2024 93.8  80.0 - 94.0 fL Final    MCH 01/03/2024 31.9 (H)  27.0 - 31.0 pg Final    MCHC 01/03/2024 33.9  33.0 - 36.0 g/dL Final    RDW 01/03/2024 12.1  11.5 - 17.0 % Final    Platelet  01/03/2024 210  130 - 400 x10(3)/mcL Final    MPV 01/03/2024 10.5 (H)  7.4 - 10.4 fL Final    Neut % 01/03/2024 70.7  % Final    Lymph % 01/03/2024 20.0  % Final    Mono % 01/03/2024 6.6  % Final    Eos % 01/03/2024 1.6  % Final    Basophil % 01/03/2024 0.6  % Final    Lymph # 01/03/2024 1.96  0.6 - 4.6 x10(3)/mcL Final    Neut # 01/03/2024 6.93  2.1 - 9.2 x10(3)/mcL Final    Mono # 01/03/2024 0.65  0.1 - 1.3 x10(3)/mcL Final    Eos # 01/03/2024 0.16  0 - 0.9 x10(3)/mcL Final    Baso # 01/03/2024 0.06  <=0.2 x10(3)/mcL Final    IG# 01/03/2024 0.05 (H)  0 - 0.04 x10(3)/mcL Final    IG% 01/03/2024 0.5  % Final    Color, UA 01/03/2024 Yellow  Yellow, Light-Yellow, Dark Yellow, Lisset, Straw Final    Appearance, UA 01/03/2024 Clear  Clear Final    Specific Gravity, UA 01/03/2024 1.015  1.005 - 1.030 Final    pH, UA 01/03/2024 8.5  5.0 - 8.5 Final    Protein, UA 01/03/2024 Negative  Negative Final    Glucose, UA 01/03/2024 Negative  Negative, Normal Final    Ketones, UA 01/03/2024 Negative  Negative Final    Blood, UA 01/03/2024 Negative  Negative Final    Bilirubin, UA 01/03/2024 Negative  Negative Final    Urobilinogen, UA 01/03/2024 0.2  0.2, 1.0, Normal Final    Nitrites, UA 01/03/2024 Negative  Negative Final    Leukocyte Esterase, UA 01/03/2024 Negative  Negative Final       RADIOLOGY STUDIES ORDERED AND REVIEWED:  Imaging Results    None         MEDICAL DECISION MAKING:    Natalie Ibarra is 28 y.o. female who  has a past medical history of Anxiety disorder, unspecified, Asthma, and Depression. arrives in ER with c/o Emesis (Pt states she has been constipated x6 days and called her OB Dr. Lafleur who told her to take milk of magnesia and magnesium citrate. Pt states shortly after she began vomiting bright red blood. Pt c/o facial numbness. Pt has red rash to face which family member says started after vomiting began. Reports she is pregnant, LMP 10/21/23.)      Reviewed Nurses Note. Reviewed Vital Signs.     Reviewed  Pertinent old records, History and updated as necessary.    Vitals:    01/03/24 1417   BP: 126/76   Pulse: 80   Resp: 18   Temp: 97.1 °F (36.2 °C)        Medical Decision Making  Differential diagnosis includes but is not limited to vomiting of pregnancy, food toxicity, viral gastroenteritis, upper GI bleed, peptic ulcer disease    Amount and/or Complexity of Data Reviewed  Labs: ordered.     Details: Labs unremarkable    Risk  Prescription drug management.                     Patient feeling much better no nausea or vomiting.      PROCEDURES PERFORMED IN ED:  Procedures    DIAGNOSTIC IMPRESSION:        ICD-10-CM ICD-9-CM   1. Nausea and vomiting, unspecified vomiting type  R11.2 787.01   2. Hematemesis of unknown etiology  K92.0 578.0   3. Less than 8 weeks gestation of pregnancy  Z3A.01 V22.2         ED Disposition Condition    Discharge Stable               Medication List        START taking these medications      ondansetron 8 MG tablet  Commonly known as: ZOFRAN  Take 1 tablet (8 mg total) by mouth every 8 (eight) hours as needed for Nausea.            ASK your doctor about these medications      hydrOXYzine HCL 25 MG tablet  Commonly known as: ATARAX     ibuprofen 400 MG tablet  Commonly known as: ADVIL,MOTRIN  Take 1 tablet (400 mg total) by mouth every 6 (six) hours as needed.               Where to Get Your Medications        These medications were sent to [id number] - [place] - [address] AT [facility] [address] [place] [address] [address]      Phone: [phone]   ondansetron 8 MG tablet           Follow-up Information       Clif Mancilla MD In 1 day.    Specialty: Family Medicine  Contact information:  [address]  [address]  [place] [id number] [phone]                              ED Prescriptions       Medication Sig Dispense Start Date End Date Auth. Provider    ondansetron (ZOFRAN) 8 MG tablet Take 1 tablet (8 mg total) by mouth every 8  (eight) hours as needed for Nausea. 12 tablet 1/3/2024 -- Idris Larkin MD          Follow-up Information       Follow up With Specialties Details Why Contact Info    Clif Mancilla MD Family Medicine In 1 day  [address]  [address]  [place] [address]  [phone]                Idirs Larkin MD  01/03/24 4504

## 2024-01-25 ENCOUNTER — HOSPITAL ENCOUNTER (EMERGENCY)
Facility: HOSPITAL | Age: 29
Discharge: HOME | End: 2024-01-25
Payer: MEDICAID

## 2024-01-25 VITALS
HEIGHT: 65 IN | RESPIRATION RATE: 19 BRPM | HEART RATE: 79 BPM | BODY MASS INDEX: 30.49 KG/M2 | SYSTOLIC BLOOD PRESSURE: 125 MMHG | TEMPERATURE: 98 F | OXYGEN SATURATION: 100 % | DIASTOLIC BLOOD PRESSURE: 79 MMHG | WEIGHT: 183 LBS

## 2024-01-25 DIAGNOSIS — O20.0 THREATENED MISCARRIAGE IN EARLY PREGNANCY: Primary | ICD-10-CM

## 2024-01-25 LAB — B-HCG FREE SERPL-ACNC: NORMAL MIU/ML

## 2024-01-25 PROCEDURE — 84702 CHORIONIC GONADOTROPIN TEST: CPT | Performed by: NURSE PRACTITIONER

## 2024-01-25 PROCEDURE — 99283 EMERGENCY DEPT VISIT LOW MDM: CPT

## 2024-01-25 NOTE — ED PROVIDER NOTES
Encounter Date: 1/25/2024       History     Chief Complaint   Patient presents with    Possible Pregnancy     States she was seen in [place] earlier today by OB and wants to have her baby checked because he told her to have a D&C but also told her everything was fine.      Pt  says she was seen in [place] earlier today by OB and wants to have her baby checked because he told her to have a D&C but also told her everything was fine.      Review of patient's allergies indicates:   Allergen Reactions    Peanut     Shellfish derived      Past Medical History:   Diagnosis Date    Anxiety disorder, unspecified     Asthma     Depression      Past Surgical History:   Procedure Laterality Date    ADENOIDECTOMY      CHOLECYSTECTOMY       Family History   Problem Relation Age of Onset    Hypertension Mother      Social History     Tobacco Use    Smoking status: Every Day     Current packs/day: 0.50     Types: Cigarettes    Smokeless tobacco: Never   Substance Use Topics    Alcohol use: Yes     Comment: occasional    Drug use: Yes     Types: Marijuana     Comment: occasional     Review of Systems   All other systems reviewed and are negative.      Physical Exam     Initial Vitals [01/25/24 1647]   BP Pulse Resp Temp SpO2   (!) 149/82 84 18 97.7 °F (36.5 °C) 100 %      MAP       --         Physical Exam    Nursing note and vitals reviewed.  Constitutional: She appears well-developed and well-nourished.   HENT:   Head: Normocephalic and atraumatic.   Eyes: Pupils are equal, round, and reactive to light.   Neck: Neck supple.   Normal range of motion.  Cardiovascular:  Normal rate, regular rhythm and normal heart sounds.           Pulmonary/Chest: Breath sounds normal.   Musculoskeletal:         General: Normal range of motion.      Cervical back: Normal range of motion and neck supple.     Neurological: She is alert and oriented to person, place, and time.   Skin: Skin is warm and dry. Capillary refill takes less than 2  seconds.   Psychiatric: She has a normal mood and affect. Her behavior is normal. Judgment and thought content normal.         ED Course   Procedures  Labs Reviewed   HCG, QUANTITATIVE    Narrative:     Beta-HCG ref range weeks after implantation (mIU/mL):   3-4wks 9-130   4-5wks    5-6wks [id number]   6-7wks [id number]   7-12wks [id number]   12-16wks 10122-353582   16-28wks 1400-35737   20-41wks [phone]          Imaging Results    None          Medications - No data to display  Medical Decision Making  Problems Addressed:  Threatened miscarriage in early pregnancy: acute illness or injury    Amount and/or Complexity of Data Reviewed  Labs: ordered.               ED Course as of 01/25/24 2010 Th Jan 25, 2024 1931 hCG, quantitative, pregnancy [AH]   1951 hCG, quantitative, pregnancy [AH]      ED Course User Index  [AH] Lulu Wasserman FNP                           Clinical Impression:  Final diagnoses:  [O20.0] Threatened miscarriage in early pregnancy (Primary)          ED Disposition Condition    Discharge Stable          ED Prescriptions    None       Follow-up Information       Follow up With Specialties Details Why Contact Info    Arpan Lafleur MD Obstetrics and Gynecology Call in 1 day Keep appointment for DNC as directed by OB/GYN [address] [place] [address]  [phone]               Lulu Wasserman FNP  01/25/24 2010

## 2024-01-28 ENCOUNTER — HOSPITAL ENCOUNTER (EMERGENCY)
Facility: HOSPITAL | Age: 29
Discharge: HOME | End: 2024-01-28
Attending: GENERAL ACUTE CARE HOSPITAL
Payer: MEDICAID

## 2024-01-28 VITALS
BODY MASS INDEX: 31.62 KG/M2 | OXYGEN SATURATION: 99 % | SYSTOLIC BLOOD PRESSURE: 125 MMHG | HEIGHT: 64 IN | TEMPERATURE: 98 F | WEIGHT: 185.19 LBS | RESPIRATION RATE: 18 BRPM | DIASTOLIC BLOOD PRESSURE: 79 MMHG | HEART RATE: 86 BPM

## 2024-01-28 DIAGNOSIS — J45.20 MILD INTERMITTENT ASTHMA WITHOUT COMPLICATION: Primary | ICD-10-CM

## 2024-01-28 DIAGNOSIS — Z3A.10 10 WEEKS GESTATION OF PREGNANCY: ICD-10-CM

## 2024-01-28 DIAGNOSIS — R06.2 WHEEZING: ICD-10-CM

## 2024-01-28 PROCEDURE — 99284 EMERGENCY DEPT VISIT MOD MDM: CPT | Mod: 25

## 2024-01-28 PROCEDURE — 25000242 PHARM REV CODE 250 ALT 637 W/ HCPCS: Performed by: NURSE PRACTITIONER

## 2024-01-28 PROCEDURE — 94761 N-INVAS EAR/PLS OXIMETRY MLT: CPT

## 2024-01-28 PROCEDURE — 94640 AIRWAY INHALATION TREATMENT: CPT

## 2024-01-28 RX ORDER — BUDESONIDE 0.5 MG/2ML
0.5 INHALANT ORAL ONCE
Status: COMPLETED | OUTPATIENT
Start: 2024-01-28 | End: 2024-01-28

## 2024-01-28 RX ORDER — IPRATROPIUM BROMIDE AND ALBUTEROL SULFATE 2.5; .5 MG/3ML; MG/3ML
3 SOLUTION RESPIRATORY (INHALATION)
Status: COMPLETED | OUTPATIENT
Start: 2024-01-28 | End: 2024-01-28

## 2024-01-28 RX ADMIN — IPRATROPIUM BROMIDE AND ALBUTEROL SULFATE 3 ML: 2.5; .5 SOLUTION RESPIRATORY (INHALATION) at 08:01

## 2024-01-28 RX ADMIN — BUDESONIDE 0.5 MG: 0.5 INHALANT RESPIRATORY (INHALATION) at 09:01

## 2024-01-28 RX ADMIN — IPRATROPIUM BROMIDE AND ALBUTEROL SULFATE 3 ML: 2.5; .5 SOLUTION RESPIRATORY (INHALATION) at 09:01

## 2024-01-29 NOTE — ED PROVIDER NOTES
Encounter Date: 1/28/2024       History     Chief Complaint   Patient presents with    Asthma     Pt states she is pregnant and having an asthma attack. Pt used her albuterol treatment prior to coming to hospital.     The patient is a 28 year old female with significant history of asthma who is incidently pregnant, she reports 10 weeks pregnant and has an appt with Dr. Lafleur for her first gyn visit tomorrow.  She states that she has been wheezing onset three days ago, her wheezing has progressively worsened over the past three days, she did try to administer a home neb treatment but she states that she is having trouble with her neb machine and that she tried to get another one through Medicaid and they would not cover one.  She states that this was about one year ago and that she has not had the need since.  She denies any chest pain, states that she is wheezing but denies any severe shortness of breath, states that she does have some nasal discharge but denies any fever and knows that she cannot take any medications for her condition until she sees gyn.  She is audibly wheezing but is in no acute distress, she denies any other associated complaints or conditions.        Review of patient's allergies indicates:   Allergen Reactions    Peanut     Shellfish derived      Past Medical History:   Diagnosis Date    Anxiety disorder, unspecified     Asthma     Depression      Past Surgical History:   Procedure Laterality Date    ADENOIDECTOMY      CHOLECYSTECTOMY       Family History   Problem Relation Age of Onset    Hypertension Mother      Social History     Tobacco Use    Smoking status: Every Day     Current packs/day: 0.50     Types: Cigarettes    Smokeless tobacco: Never   Substance Use Topics    Alcohol use: Yes     Comment: occasional    Drug use: Yes     Types: Marijuana     Comment: occasional     Review of Systems   All other systems reviewed and are negative.      Physical Exam     Initial Vitals [01/28/24  1946]   BP Pulse Resp Temp SpO2   108/72 87 20 98.3 °F (36.8 °C) 99 %      MAP       --         Physical Exam    Nursing note and vitals reviewed.  Constitutional: She appears well-developed and well-nourished.   HENT:   Head: Normocephalic and atraumatic.   Eyes: Conjunctivae are normal. Pupils are equal, round, and reactive to light.   Neck: Neck supple.   Cardiovascular:  Normal rate, regular rhythm and normal heart sounds.           Pulmonary/Chest: No accessory muscle usage. No tachypnea. No respiratory distress. She has wheezes in the right lower field.   Wheezing right lower field, bronchial congestion   Abdominal: Abdomen is soft.   Musculoskeletal:         General: Normal range of motion.      Cervical back: Neck supple.     Neurological: She is alert and oriented to person, place, and time. She has normal strength. GCS score is 15. GCS eye subscore is 4. GCS verbal subscore is 5. GCS motor subscore is 6.   Skin: Skin is warm and dry.   Psychiatric: She has a normal mood and affect. Her behavior is normal. Judgment and thought content normal.         ED Course   Procedures  Labs Reviewed - No data to display       Imaging Results    None          Medications   albuterol-ipratropium 2.5 mg-0.5 mg/3 mL nebulizer solution 3 mL (has no administration in time range)   budesonide nebulizer solution 0.5 mg (has no administration in time range)   albuterol-ipratropium 2.5 mg-0.5 mg/3 mL nebulizer solution 3 mL (3 mLs Nebulization Given 1/28/24 2020)     Medical Decision Making  As stated in HPI.    DDX:  asthma, wheezing    Amount and/or Complexity of Data Reviewed  External Data Reviewed: notes.     Details: History of asthma with unknown trigger  Discussion of management or test interpretation with external provider(s): First douneb administered with good results, wheezing resolved, the patient states that she is feeling much better, to err on the side of caution we will wait 30 minutes and administer one more tx  prior to discharge.  She was given instructions to discuss the need for another neb machine tomorrow at her visit as we do not have any success getting the nebulizer machines covered through her insurance in my experience when they are prescribed through the ER.  She verbalized understanding and will keep her gyn appt tomorrow.  Risk versus benefit of systemic steroids discussed with the patient and it was decided that the risk was not worth the benefit since she responded well to the neb tx.  She will return to the ED if the need arises.    Risk  Prescription drug management.                                      Clinical Impression:  Final diagnoses:  [J45.20] Mild intermittent asthma without complication (Primary)  [R06.2] Wheezing  [Z3A.10] 10 weeks gestation of pregnancy          ED Disposition Condition    Discharge Stable          ED Prescriptions    None       Follow-up Information       Follow up With Specialties Details Why Contact Info [facility] - Emergency Dept Emergency Medicine  As needed, If symptoms worsen, K[facility] gyn appt tomorrow [address] [facility] [address]  [phone]             Darlin West, FNP  01/28/24 6166

## 2024-02-26 ENCOUNTER — HOSPITAL ENCOUNTER (OUTPATIENT)
Dept: RADIOLOGY | Facility: HOSPITAL | Age: 29
Discharge: HOME | End: 2024-02-26
Payer: MEDICAID

## 2024-02-26 DIAGNOSIS — R06.2 WHEEZING: ICD-10-CM

## 2024-02-26 PROCEDURE — 71046 X-RAY EXAM CHEST 2 VIEWS: CPT | Mod: TC

## 2024-03-20 ENCOUNTER — HOSPITAL ENCOUNTER (EMERGENCY)
Facility: HOSPITAL | Age: 29
Discharge: HOME | End: 2024-03-20
Attending: EMERGENCY MEDICINE
Payer: MEDICAID

## 2024-03-20 VITALS
TEMPERATURE: 97 F | SYSTOLIC BLOOD PRESSURE: 117 MMHG | WEIGHT: 190 LBS | RESPIRATION RATE: 22 BRPM | HEART RATE: 80 BPM | BODY MASS INDEX: 32.44 KG/M2 | DIASTOLIC BLOOD PRESSURE: 71 MMHG | OXYGEN SATURATION: 98 % | HEIGHT: 64 IN

## 2024-03-20 DIAGNOSIS — R06.02 SHORTNESS OF BREATH: ICD-10-CM

## 2024-03-20 DIAGNOSIS — J45.901 EXACERBATION OF ASTHMA, UNSPECIFIED ASTHMA SEVERITY, UNSPECIFIED WHETHER PERSISTENT: Primary | ICD-10-CM

## 2024-03-20 LAB
FLUAV AG UPPER RESP QL IA.RAPID: NOT DETECTED
FLUBV AG UPPER RESP QL IA.RAPID: NOT DETECTED
SARS-COV-2 RNA RESP QL NAA+PROBE: NOT DETECTED
STREP A PCR (OHS): NOT DETECTED

## 2024-03-20 PROCEDURE — 25000242 PHARM REV CODE 250 ALT 637 W/ HCPCS: Performed by: NURSE PRACTITIONER

## 2024-03-20 PROCEDURE — 87651 STREP A DNA AMP PROBE: CPT | Performed by: NURSE PRACTITIONER

## 2024-03-20 PROCEDURE — 96372 THER/PROPH/DIAG INJ SC/IM: CPT | Performed by: NURSE PRACTITIONER

## 2024-03-20 PROCEDURE — 99284 EMERGENCY DEPT VISIT MOD MDM: CPT | Mod: 25

## 2024-03-20 PROCEDURE — 94761 N-INVAS EAR/PLS OXIMETRY MLT: CPT

## 2024-03-20 PROCEDURE — 0240U COVID/FLU A&B PCR: CPT | Performed by: NURSE PRACTITIONER

## 2024-03-20 PROCEDURE — 63600175 PHARM REV CODE 636 W HCPCS: Performed by: NURSE PRACTITIONER

## 2024-03-20 PROCEDURE — 94640 AIRWAY INHALATION TREATMENT: CPT

## 2024-03-20 RX ORDER — ALBUTEROL SULFATE 2.5 MG/.5ML
2.5 SOLUTION RESPIRATORY (INHALATION) EVERY 4 HOURS PRN
Qty: 1 EACH | Refills: 1 | Status: SHIPPED | OUTPATIENT
Start: 2024-03-20 | End: 2025-03-20

## 2024-03-20 RX ORDER — MONTELUKAST SODIUM 10 MG/1
10 TABLET ORAL NIGHTLY
Qty: 30 TABLET | Refills: 0 | Status: SHIPPED | OUTPATIENT
Start: 2024-03-20 | End: 2024-04-19

## 2024-03-20 RX ORDER — IPRATROPIUM BROMIDE AND ALBUTEROL SULFATE 2.5; .5 MG/3ML; MG/3ML
3 SOLUTION RESPIRATORY (INHALATION)
Status: COMPLETED | OUTPATIENT
Start: 2024-03-20 | End: 2024-03-20

## 2024-03-20 RX ORDER — DEXAMETHASONE SODIUM PHOSPHATE 4 MG/ML
4 INJECTION, SOLUTION INTRA-ARTICULAR; INTRALESIONAL; INTRAMUSCULAR; INTRAVENOUS; SOFT TISSUE
Status: COMPLETED | OUTPATIENT
Start: 2024-03-20 | End: 2024-03-20

## 2024-03-20 RX ORDER — PREDNISONE 20 MG/1
40 TABLET ORAL DAILY
Qty: 10 TABLET | Refills: 0 | Status: SHIPPED | OUTPATIENT
Start: 2024-03-20 | End: 2024-03-25

## 2024-03-20 RX ADMIN — IPRATROPIUM BROMIDE AND ALBUTEROL SULFATE 3 ML: .5; 3 SOLUTION RESPIRATORY (INHALATION) at 12:03

## 2024-03-20 RX ADMIN — DEXAMETHASONE SODIUM PHOSPHATE 4 MG: 4 INJECTION, SOLUTION INTRA-ARTICULAR; INTRALESIONAL; INTRAMUSCULAR; INTRAVENOUS; SOFT TISSUE at 01:03

## 2024-03-20 NOTE — Clinical Note
"Natalie Duvall" Stacey was seen and treated in our emergency department on 3/20/2024.  She may return to work on 03/22/2024.       If you have any questions or concerns, please don't hesitate to call.      Clif Jane, SALVATORE"

## 2024-03-20 NOTE — ED PROVIDER NOTES
Encounter Date: 3/20/2024       History     Chief Complaint   Patient presents with    Shortness of Breath     C/o fever and shortness of breath since yesterday. Hx asthma. States she did albuterol treatment about 30 min ago as well as used her rescue inhaler with little to no relief. SpO2 98% on room air in triage.     Patient is a 29-year-old female who presents to the emergency department with coughing shortness of breath.  Patient reports symptoms started yesterday around 6:00 a.m..  She reports she also had a fever at that time of 102.  She states she took Tylenol several doses out today as well as use her inhaler rescue and her nebulizer machine frequently as well.  She states they main issue she is having now is tightness with makes it hard for her to breathe.  She denies running fever today.  She does have slight cough in his had nasal drainage but this has been going on constantly for 4 weeks since she was diagnose with COVID.  Denies any known sick contacts.  Does have frequent exposure to secondhand smoke through boyfriend family and coworkers.  She denies any other complaints or associated symptoms at this time.      Review of patient's allergies indicates:   Allergen Reactions    Peanut     Shellfish derived      Past Medical History:   Diagnosis Date    Anxiety disorder, unspecified     Asthma     Depression      Past Surgical History:   Procedure Laterality Date    ADENOIDECTOMY      CHOLECYSTECTOMY      DILATION AND CURETTAGE OF UTERUS  02/01/2024     Family History   Problem Relation Age of Onset    Hypertension Mother      Social History     Tobacco Use    Smoking status: Every Day     Current packs/day: 0.50     Types: Cigarettes    Smokeless tobacco: Never   Substance Use Topics    Alcohol use: Yes     Comment: occasional    Drug use: Yes     Types: Marijuana     Comment: occasional     Review of Systems   Constitutional:  Negative for activity change, appetite change and fever.   HENT:  Negative  for congestion, dental problem and sore throat.    Eyes:  Negative for discharge and itching.   Respiratory:  Positive for cough, shortness of breath and wheezing. Negative for apnea and chest tightness.    Cardiovascular:  Negative for chest pain.   Gastrointestinal:  Negative for abdominal distention, abdominal pain and nausea.   Genitourinary:  Negative for dysuria, vaginal bleeding, vaginal discharge and vaginal pain.   Musculoskeletal:  Negative for back pain.   Skin:  Negative for rash.   Neurological:  Negative for dizziness, facial asymmetry and weakness.   Hematological:  Does not bruise/bleed easily.   Psychiatric/Behavioral:  Negative for agitation and behavioral problems.    All other systems reviewed and are negative.      Physical Exam     Initial Vitals [03/20/24 1227]   BP Pulse Resp Temp SpO2   112/79 99 (!) 22 97.4 °F (36.3 °C) 98 %      MAP       --         Physical Exam    Nursing note and vitals reviewed.  Constitutional: Vital signs are normal. She appears well-developed and well-nourished. She is not diaphoretic.  Non-toxic appearance. She does not have a sickly appearance. No distress.   HENT:   Head: Normocephalic and atraumatic.   Right Ear: External ear normal.   Eyes: Conjunctivae, EOM and lids are normal. Pupils are equal, round, and reactive to light. Lids are everted and swept, no foreign bodies found. Right eye exhibits no discharge.   Neck: Trachea normal and phonation normal. Neck supple. No thyroid mass and no thyromegaly present.   Normal range of motion.   Full passive range of motion without pain.     Cardiovascular:  Normal rate, regular rhythm, S1 normal, S2 normal, normal heart sounds, intact distal pulses and normal pulses.           Pulmonary/Chest: She is in respiratory distress. She has wheezes.   Abdominal: Abdomen is soft. There is no abdominal tenderness.   Musculoskeletal:         General: No tenderness or edema. Normal range of motion.      Cervical back: Full  passive range of motion without pain, normal range of motion and neck supple.     Lymphadenopathy:     She has no cervical adenopathy.   Neurological: She is alert and oriented to person, place, and time. She has normal strength.   Skin: Skin is warm, dry and intact. Capillary refill takes less than 2 seconds.   Psychiatric: She has a normal mood and affect. Her speech is normal and behavior is normal. Judgment normal. Cognition and memory are normal.         ED Course   Procedures  Labs Reviewed   COVID/FLU A&B PCR - Normal    Narrative:     The Xpert Xpress SARS-CoV-2/FLU/RSV plus is a rapid, multiplexed real-time PCR test intended for the simultaneous qualitative detection and differentiation of SARS-CoV-2, Influenza A, Influenza B, and respiratory syncytial virus (RSV) viral RNA in either nasopharyngeal swab or nasal swab specimens.         STREP GROUP A BY PCR - Normal    Narrative:     The Xpert Xpress Strep A test is a rapid, qualitative in vitro diagnostic test for the detection of Streptococcus pyogenes (Group A ß-hemolytic Streptococcus, Strep A) in throat swab specimens from patients with signs and symptoms of pharyngitis.            Imaging Results              X-Ray Chest PA And Lateral (Preliminary result)  Result time 03/20/24 13:40:43      Wet Read by Clif Jane FNP (03/20/24 13:40:43, [facility] - Emergency Dept, Emergency Medicine)    Wet read:  No obvious acute cardiopulmonary abnormality seen on plain film chest x-ray.  Pending radiologist's review.                                     Medications   albuterol-ipratropium 2.5 mg-0.5 mg/3 mL nebulizer solution 3 mL (3 mLs Nebulization Given 3/20/24 1249)   dexAMETHasone injection 4 mg (4 mg Intramuscular Given 3/20/24 1301)     Medical Decision Making  Patient is a 29-year-old female who presents to the emergency department with coughing shortness of breath.  Patient reports symptoms started yesterday around 6:00 a.m..  She  reports she also had a fever at that time of 102.  She states she took Tylenol several doses out today as well as use her inhaler rescue and her nebulizer machine frequently as well.  She states they main issue she is having now is tightness with makes it hard for her to breathe.  She denies running fever today.  She does have slight cough in his had nasal drainage but this has been going on constantly for 4 weeks since she was diagnose with COVID.  Denies any known sick contacts.  Does have frequent exposure to secondhand smoke through boyfriend family and coworkers.  She denies any other complaints or associated symptoms at this time.    Problems Addressed:  Exacerbation of asthma, unspecified asthma severity, unspecified whether persistent: acute illness or injury     Details: Patient is about 90% better than when she arrived.  There is still slight expiratory wheeze but very minimal.  Patient is no longer in any respiratory distress and able to speak in full sentences without having to stop and take a big deep breath.  The patient will be sent home with Singulair she has not been prescribed this ever for asthma.  Will send home with albuterol as well as short course of steroids.  Discussed follow-up PCP for continued asthma medication management.  Strict ED return precautions discussed for any change or worsening symptoms.    Amount and/or Complexity of Data Reviewed  Labs: ordered.  Radiology: ordered and independent interpretation performed.    Risk  Prescription drug management.               ED Course as of 03/20/24 1353   Wed Mar 20, 2024   1312 Patient already reports significant relief for symptoms after breathing treatment and steroids.  She was visibly struggling earlier to catch her breath in between speaking to me but this time is much more relaxed and does not have any signs of distress.  Waiting for rest for workup to return including COVID swab and chest x-ray. [SL]      ED Course User Index  [SL]  Clif Jane FNP                           Clinical Impression:  Final diagnoses:  [R06.02] Shortness of breath  [J45.901] Exacerbation of asthma, unspecified asthma severity, unspecified whether persistent (Primary)          ED Disposition Condition    Discharge Stable          ED Prescriptions       Medication Sig Dispense Start Date End Date Auth. Provider    montelukast (SINGULAIR) 10 mg tablet Take 1 tablet (10 mg total) by mouth every evening. 30 tablet 3/20/2024 4/19/2024 Clif Jane FNP    albuterol sulfate 2.5 mg/0.5 mL Nebu Take 2.5 mg by nebulization every 4 (four) hours as needed (Shortness of breath). Rescue 1 each 3/20/2024 3/20/2025 Clif Jane FNP    predniSONE (DELTASONE) 20 MG tablet Take 2 tablets (40 mg total) by mouth once daily. for 5 days 10 tablet 3/20/2024 3/25/2024 Clif Jane FNP          Follow-up Information       Follow up With Specialties Details Why Contact Info    Satnam iDllard FNP  Schedule an appointment as soon as possible for a visit in 2 days For ER Follow Up. [address] [place] [address]  [phone]               Clif Jane FNP  03/20/24 2666

## 2024-03-28 ENCOUNTER — LAB VISIT (OUTPATIENT)
Dept: LAB | Facility: HOSPITAL | Age: 29
End: 2024-03-28
Attending: NURSE PRACTITIONER
Payer: MEDICAID

## 2024-03-28 DIAGNOSIS — Z36.3 ENCOUNTER FOR ROUTINE SCREENING FOR MALFORMATION USING ULTRASONICS: Primary | ICD-10-CM

## 2024-03-28 DIAGNOSIS — Z01.812 PRE-OPERATIVE LABORATORY EXAMINATION: ICD-10-CM

## 2024-03-28 LAB
ALBUMIN SERPL-MCNC: 3.8 G/DL (ref 3.5–5)
ALBUMIN/GLOB SERPL: 1.1 RATIO (ref 1.1–2)
ALP SERPL-CCNC: 44 UNIT/L (ref 40–150)
ALT SERPL-CCNC: 18 UNIT/L (ref 0–55)
AST SERPL-CCNC: 16 UNIT/L (ref 5–34)
BILIRUB SERPL-MCNC: 0.5 MG/DL
BUN SERPL-MCNC: 10 MG/DL (ref 7–18.7)
CALCIUM SERPL-MCNC: 8.7 MG/DL (ref 8.4–10.2)
CHLORIDE SERPL-SCNC: 110 MMOL/L (ref 98–107)
CHOLEST SERPL-MCNC: 162 MG/DL
CHOLEST/HDLC SERPL: 3 {RATIO} (ref 0–5)
CO2 SERPL-SCNC: 23 MMOL/L (ref 22–29)
CREAT SERPL-MCNC: 0.8 MG/DL (ref 0.55–1.02)
DEPRECATED CALCIDIOL+CALCIFEROL SERPL-MC: 43.9 NG/ML (ref 30–80)
ERYTHROCYTE [DISTWIDTH] IN BLOOD BY AUTOMATED COUNT: 13 % (ref 11.5–17)
EST. AVERAGE GLUCOSE BLD GHB EST-MCNC: 82.5 MG/DL
GFR SERPLBLD CREATININE-BSD FMLA CKD-EPI: >60 MLS/MIN/1.73/M2
GLOBULIN SER-MCNC: 3.5 GM/DL (ref 2.4–3.5)
GLUCOSE SERPL-MCNC: 82 MG/DL (ref 74–100)
HBA1C MFR BLD: 4.5 %
HCT VFR BLD AUTO: 42 % (ref 37–47)
HDLC SERPL-MCNC: 55 MG/DL (ref 35–60)
HGB BLD-MCNC: 13.9 G/DL (ref 12–16)
LDLC SERPL CALC-MCNC: 87 MG/DL (ref 50–140)
MCH RBC QN AUTO: 31 PG (ref 27–31)
MCHC RBC AUTO-ENTMCNC: 33.1 G/DL (ref 33–36)
MCV RBC AUTO: 93.5 FL (ref 80–94)
PLATELET # BLD AUTO: 270 X10(3)/MCL (ref 130–400)
PMV BLD AUTO: 10.2 FL (ref 7.4–10.4)
POTASSIUM SERPL-SCNC: 4 MMOL/L (ref 3.5–5.1)
PROT SERPL-MCNC: 7.3 GM/DL (ref 6.4–8.3)
RBC # BLD AUTO: 4.49 X10(6)/MCL (ref 4.2–5.4)
SODIUM SERPL-SCNC: 138 MMOL/L (ref 136–145)
TRIGL SERPL-MCNC: 101 MG/DL (ref 37–140)
TSH SERPL-ACNC: 1.89 UIU/ML (ref 0.35–4.94)
VIT B12 SERPL-MCNC: 633 PG/ML (ref 213–816)
VLDLC SERPL CALC-MCNC: 20 MG/DL
WBC # SPEC AUTO: 10.33 X10(3)/MCL (ref 4.5–11.5)

## 2024-03-28 PROCEDURE — 36415 COLL VENOUS BLD VENIPUNCTURE: CPT

## 2024-03-28 PROCEDURE — 85027 COMPLETE CBC AUTOMATED: CPT

## 2024-03-28 PROCEDURE — 83036 HEMOGLOBIN GLYCOSYLATED A1C: CPT

## 2024-03-28 PROCEDURE — 82607 VITAMIN B-12: CPT

## 2024-03-28 PROCEDURE — 80053 COMPREHEN METABOLIC PANEL: CPT

## 2024-03-28 PROCEDURE — 82306 VITAMIN D 25 HYDROXY: CPT

## 2024-03-28 PROCEDURE — 80061 LIPID PANEL: CPT

## 2024-03-28 PROCEDURE — 84443 ASSAY THYROID STIM HORMONE: CPT

## 2024-12-12 ENCOUNTER — HOSPITAL ENCOUNTER (EMERGENCY)
Facility: HOSPITAL | Age: 29
Discharge: HOME | End: 2024-12-13
Attending: INTERNAL MEDICINE
Payer: MEDICAID

## 2024-12-12 ENCOUNTER — HOSPITAL ENCOUNTER (EMERGENCY)
Facility: HOSPITAL | Age: 29
Discharge: HOME | End: 2024-12-12
Attending: EMERGENCY MEDICINE
Payer: MEDICAID

## 2024-12-12 VITALS
SYSTOLIC BLOOD PRESSURE: 137 MMHG | HEIGHT: 64 IN | RESPIRATION RATE: 20 BRPM | HEART RATE: 97 BPM | OXYGEN SATURATION: 99 % | WEIGHT: 180 LBS | DIASTOLIC BLOOD PRESSURE: 78 MMHG | TEMPERATURE: 98 F | BODY MASS INDEX: 30.73 KG/M2

## 2024-12-12 DIAGNOSIS — R06.02 SHORTNESS OF BREATH: ICD-10-CM

## 2024-12-12 DIAGNOSIS — J45.41 ASTHMA IN ADULT, MODERATE PERSISTENT, WITH ACUTE EXACERBATION: Primary | ICD-10-CM

## 2024-12-12 DIAGNOSIS — J45.901 EXACERBATION OF ASTHMA, UNSPECIFIED ASTHMA SEVERITY, UNSPECIFIED WHETHER PERSISTENT: Primary | ICD-10-CM

## 2024-12-12 DIAGNOSIS — R06.02 SOB (SHORTNESS OF BREATH): ICD-10-CM

## 2024-12-12 LAB
ALBUMIN SERPL-MCNC: 3.9 G/DL (ref 3.5–5)
ALBUMIN/GLOB SERPL: 1.1 RATIO (ref 1.1–2)
ALP SERPL-CCNC: 52 UNIT/L (ref 40–150)
ALT SERPL-CCNC: 11 UNIT/L (ref 0–55)
ANION GAP SERPL CALC-SCNC: 11 MEQ/L
AST SERPL-CCNC: 12 UNIT/L (ref 5–34)
B-HCG UR QL: NEGATIVE
BACTERIA #/AREA URNS AUTO: ABNORMAL /HPF
BASOPHILS # BLD AUTO: 0.02 X10(3)/MCL
BASOPHILS NFR BLD AUTO: 0.2 %
BILIRUB SERPL-MCNC: 0.2 MG/DL
BILIRUB UR QL STRIP.AUTO: NEGATIVE
BUN SERPL-MCNC: 10 MG/DL (ref 7–18.7)
CALCIUM SERPL-MCNC: 9.6 MG/DL (ref 8.4–10.2)
CHLORIDE SERPL-SCNC: 110 MMOL/L (ref 98–107)
CLARITY UR: ABNORMAL
CO2 SERPL-SCNC: 18 MMOL/L (ref 22–29)
COLOR UR AUTO: YELLOW
CREAT SERPL-MCNC: 0.81 MG/DL (ref 0.55–1.02)
CREAT/UREA NIT SERPL: 12
EOSINOPHIL # BLD AUTO: 0 X10(3)/MCL (ref 0–0.9)
EOSINOPHIL NFR BLD AUTO: 0 %
ERYTHROCYTE [DISTWIDTH] IN BLOOD BY AUTOMATED COUNT: 13 % (ref 11.5–17)
FLUAV AG UPPER RESP QL IA.RAPID: NOT DETECTED
FLUBV AG UPPER RESP QL IA.RAPID: NOT DETECTED
GFR SERPLBLD CREATININE-BSD FMLA CKD-EPI: >60 ML/MIN/1.73/M2
GLOBULIN SER-MCNC: 3.4 GM/DL (ref 2.4–3.5)
GLUCOSE SERPL-MCNC: 162 MG/DL (ref 74–100)
GLUCOSE UR QL STRIP: ABNORMAL
HCT VFR BLD AUTO: 43 % (ref 37–47)
HGB BLD-MCNC: 14.3 G/DL (ref 12–16)
HGB UR QL STRIP: NEGATIVE
IMM GRANULOCYTES # BLD AUTO: 0.04 X10(3)/MCL (ref 0–0.04)
IMM GRANULOCYTES NFR BLD AUTO: 0.4 %
KETONES UR QL STRIP: ABNORMAL
LEUKOCYTE ESTERASE UR QL STRIP: ABNORMAL
LYMPHOCYTES # BLD AUTO: 0.45 X10(3)/MCL (ref 0.6–4.6)
LYMPHOCYTES NFR BLD AUTO: 4.7 %
MCH RBC QN AUTO: 31.3 PG (ref 27–31)
MCHC RBC AUTO-ENTMCNC: 33.3 G/DL (ref 33–36)
MCV RBC AUTO: 94.1 FL (ref 80–94)
MONOCYTES # BLD AUTO: 0.62 X10(3)/MCL (ref 0.1–1.3)
MONOCYTES NFR BLD AUTO: 6.5 %
NEUTROPHILS # BLD AUTO: 8.45 X10(3)/MCL (ref 2.1–9.2)
NEUTROPHILS NFR BLD AUTO: 88.2 %
NITRITE UR QL STRIP: NEGATIVE
PH UR STRIP: 5.5 [PH]
PLATELET # BLD AUTO: 307 X10(3)/MCL (ref 130–400)
PMV BLD AUTO: 10.8 FL (ref 7.4–10.4)
POTASSIUM SERPL-SCNC: 3.3 MMOL/L (ref 3.5–5.1)
PROT SERPL-MCNC: 7.3 GM/DL (ref 6.4–8.3)
PROT UR QL STRIP: ABNORMAL
RBC # BLD AUTO: 4.57 X10(6)/MCL (ref 4.2–5.4)
RBC #/AREA URNS AUTO: ABNORMAL /HPF
SARS-COV-2 RNA RESP QL NAA+PROBE: NOT DETECTED
SODIUM SERPL-SCNC: 139 MMOL/L (ref 136–145)
SP GR UR STRIP.AUTO: >=1.03 (ref 1–1.03)
SQUAMOUS #/AREA URNS AUTO: ABNORMAL /HPF
UROBILINOGEN UR STRIP-ACNC: 0.2
WBC # BLD AUTO: 9.58 X10(3)/MCL (ref 4.5–11.5)
WBC #/AREA URNS AUTO: ABNORMAL /HPF

## 2024-12-12 PROCEDURE — 94640 AIRWAY INHALATION TREATMENT: CPT

## 2024-12-12 PROCEDURE — 94761 N-INVAS EAR/PLS OXIMETRY MLT: CPT

## 2024-12-12 PROCEDURE — 25000242 PHARM REV CODE 250 ALT 637 W/ HCPCS: Performed by: EMERGENCY MEDICINE

## 2024-12-12 PROCEDURE — 25000242 PHARM REV CODE 250 ALT 637 W/ HCPCS: Performed by: INTERNAL MEDICINE

## 2024-12-12 PROCEDURE — 0240U COVID/FLU A&B PCR: CPT | Performed by: EMERGENCY MEDICINE

## 2024-12-12 PROCEDURE — 94640 AIRWAY INHALATION TREATMENT: CPT | Mod: XB

## 2024-12-12 PROCEDURE — 81025 URINE PREGNANCY TEST: CPT | Performed by: INTERNAL MEDICINE

## 2024-12-12 PROCEDURE — 96365 THER/PROPH/DIAG IV INF INIT: CPT

## 2024-12-12 PROCEDURE — 96372 THER/PROPH/DIAG INJ SC/IM: CPT | Performed by: INTERNAL MEDICINE

## 2024-12-12 PROCEDURE — 81003 URINALYSIS AUTO W/O SCOPE: CPT | Performed by: INTERNAL MEDICINE

## 2024-12-12 PROCEDURE — 99285 EMERGENCY DEPT VISIT HI MDM: CPT | Mod: 25,27

## 2024-12-12 PROCEDURE — 63600175 PHARM REV CODE 636 W HCPCS: Performed by: INTERNAL MEDICINE

## 2024-12-12 PROCEDURE — 80053 COMPREHEN METABOLIC PANEL: CPT | Performed by: INTERNAL MEDICINE

## 2024-12-12 PROCEDURE — 96375 TX/PRO/DX INJ NEW DRUG ADDON: CPT

## 2024-12-12 PROCEDURE — 85025 COMPLETE CBC W/AUTO DIFF WBC: CPT | Performed by: INTERNAL MEDICINE

## 2024-12-12 PROCEDURE — 63600175 PHARM REV CODE 636 W HCPCS: Performed by: EMERGENCY MEDICINE

## 2024-12-12 PROCEDURE — 99285 EMERGENCY DEPT VISIT HI MDM: CPT | Mod: 25

## 2024-12-12 PROCEDURE — 99900031 HC PATIENT EDUCATION (STAT)

## 2024-12-12 RX ORDER — METHYLPREDNISOLONE SOD SUCC 125 MG
125 VIAL (EA) INJECTION
Status: COMPLETED | OUTPATIENT
Start: 2024-12-12 | End: 2024-12-12

## 2024-12-12 RX ORDER — IPRATROPIUM BROMIDE AND ALBUTEROL SULFATE 2.5; .5 MG/3ML; MG/3ML
3 SOLUTION RESPIRATORY (INHALATION)
Status: COMPLETED | OUTPATIENT
Start: 2024-12-12 | End: 2024-12-12

## 2024-12-12 RX ORDER — DEXAMETHASONE SODIUM PHOSPHATE 4 MG/ML
8 INJECTION, SOLUTION INTRA-ARTICULAR; INTRALESIONAL; INTRAMUSCULAR; INTRAVENOUS; SOFT TISSUE
Status: COMPLETED | OUTPATIENT
Start: 2024-12-12 | End: 2024-12-12

## 2024-12-12 RX ORDER — PREDNISONE 20 MG/1
20 TABLET ORAL 2 TIMES DAILY
Qty: 6 TABLET | Refills: 0 | Status: SHIPPED | OUTPATIENT
Start: 2024-12-12 | End: 2024-12-15

## 2024-12-12 RX ORDER — BUDESONIDE 0.5 MG/2ML
0.5 INHALANT ORAL
Status: COMPLETED | OUTPATIENT
Start: 2024-12-12 | End: 2024-12-12

## 2024-12-12 RX ORDER — MAGNESIUM SULFATE HEPTAHYDRATE 40 MG/ML
2 INJECTION, SOLUTION INTRAVENOUS
Status: COMPLETED | OUTPATIENT
Start: 2024-12-12 | End: 2024-12-12

## 2024-12-12 RX ORDER — IPRATROPIUM BROMIDE AND ALBUTEROL SULFATE 2.5; .5 MG/3ML; MG/3ML
3 SOLUTION RESPIRATORY (INHALATION) EVERY 6 HOURS PRN
Qty: 75 ML | Refills: 0 | Status: SHIPPED | OUTPATIENT
Start: 2024-12-12 | End: 2025-12-12

## 2024-12-12 RX ADMIN — METHYLPREDNISOLONE SODIUM SUCCINATE 125 MG: 125 INJECTION, POWDER, FOR SOLUTION INTRAMUSCULAR; INTRAVENOUS at 11:12

## 2024-12-12 RX ADMIN — IPRATROPIUM BROMIDE AND ALBUTEROL SULFATE 3 ML: .5; 3 SOLUTION RESPIRATORY (INHALATION) at 12:12

## 2024-12-12 RX ADMIN — MAGNESIUM SULFATE HEPTAHYDRATE 2 G: 40 INJECTION, SOLUTION INTRAVENOUS at 11:12

## 2024-12-12 RX ADMIN — IPRATROPIUM BROMIDE AND ALBUTEROL SULFATE 3 ML: .5; 3 SOLUTION RESPIRATORY (INHALATION) at 10:12

## 2024-12-12 RX ADMIN — BUDESONIDE 0.5 MG: 0.5 INHALANT RESPIRATORY (INHALATION) at 10:12

## 2024-12-12 RX ADMIN — DEXAMETHASONE SODIUM PHOSPHATE 8 MG: 4 INJECTION, SOLUTION INTRA-ARTICULAR; INTRALESIONAL; INTRAMUSCULAR; INTRAVENOUS; SOFT TISSUE at 10:12

## 2024-12-12 NOTE — Clinical Note
"Natalie Duvall" Stacey was seen and treated in our emergency department on 12/12/2024.  She may return to work on 12/13/2024.       If you have any questions or concerns, please don't hesitate to call.       RN    "

## 2024-12-12 NOTE — DISCHARGE INSTRUCTIONS
TAKE THE MEDICATIONS, AS PRESCRIBED, FOR THE FULL COURSE OF THERAPY.      PLENTY OF FLUIDS.      I HAVE SWITCHED HER FROM PLAIN ALBUTEROL 2 ALBUTEROL-IPRATROPIUM BROMIDE FOR YOUR NEBULIZER TREATMENTS.    UNFORTUNATELY, THERE ARE NO PULMONARY MEDICINES/PULMONOLOGIST AVAILABLE IN LOCAL AREA.  I RECOMMEND THAT YOU, ESSENTIALLY GOOGLE, PULMONOLOGIST IN THE [facility], MAKE AN APPOINTMENT AND BE SEEN.  THERE LOTS OF VERY GOOD BIOLOGIC MEDICATIONS NOW FOR THE PREVENTION OF ACUTE ASTHMA ATTACKS FOR WHICH YOUR CANDIDATE.  PLEASE DO THIS HOMEWORK AND GET SEEN BY A PULMONOLOGIST SO THAT YOUR ASTHMA CAN BE STABILIZED.    ++++++++++++++++++++++++++++++++++++++++++++++++++++++++++++++++++++  It is really time for you to  consider and ACTIVELY PURSUE SMOKING CESSATION.  SMOKING IS THE SINGLE MOST SERIOUS AND DETRIMENTAL FACTOR CONTRIBUTING TO THE DEVELOPMENT OF SERIOUS HEALTH PROBLEMS--to include, emphysema, COPD, coronary artery disease, peripheral vascular disease, STROKE, HEART ATTACK, HEART FAILURE, lung cancer, colon cancer, bladder cancer and a myriad of other serious diseases.    Contact the folks at www.quitEcohausla.org (or, any of the other below listed resources) to begin the process of smoking cessation. These folks can help with down ramping your dependence upon nicotine, introducing other healthy lifestyle habits, good nutrition, and behavioral support groups that will greatly enhance your chances at being successful at smoking cessation.    RESOURCES:    Quit With Vale Lind!  www.quitSupplyFrame.org/    Smoking and Pregnancy What Every Mom Needs to Know · Quit With Us ... Remember that everyone's quit story is different. ... You can do this, [place]!    ?1-800-QUIT-NOW · ?Pregnancy & Smoking · ?Medication · ?Get a Quit Plan  Quit Smoking -- [place] Cancer Prevention and Control Programs ...  [facility].org/quit-smoking    Quit With SOCORRO Lind. Visit www.Pressgram.org, to gain access to a variety of  "resources available throughout [place]. The website is full of practical quit smoking ...  Smoking Cessation Program [place]  Quit Smoking [place]  https://www.cardio.com/smoking-cessation    Are you ready to quit smoking? You could qualify for free smoking cessation treatment. [facility].  Smoking [facility]essation [facility] - [place], [place]  Facebook  https://www.allyve.com  Places  [place], [place]  Medical & Health    [facility] - [address], [facility] [address], [place], [place] [address] - Rated 3.9 based on 10 Reviews "I am new to the program but I am 27...  [PDF]    "

## 2024-12-12 NOTE — ED PROVIDER NOTES
"Encounter Date: 12/12/2024       History     Chief Complaint   Patient presents with    Shortness of Breath     C/o fever & SOB that started yesterday. Hx asthma     This is a 29-year-old female with a history of asthma who presents to the emergency department today complaining of some shortness of breath and subjective fever that began yesterday afternoon.  She has had a mild upper respiratory tract infection with some nasal drainage antecedent to this presentation.  She has continued to smoke, until approximately 1 week ago.  She has never been intubated; steroid independent.  However, she says that "I use my nebulizer once in the morning and maybe once in the evening, every day. "  She reports that she use her nebulizer machine last night and earlier today; however, she has failed to improve or resolve and that is prompted her to visit the ED.          Review of patient's allergies indicates:   Allergen Reactions    Peanut     Shellfish derived      Past Medical History:   Diagnosis Date    Anxiety disorder, unspecified     Asthma     Depression      Past Surgical History:   Procedure Laterality Date    ADENOIDECTOMY      CHOLECYSTECTOMY      DILATION AND CURETTAGE OF UTERUS  02/01/2024     Family History   Problem Relation Name Age of Onset    Hypertension Mother       Social History     Tobacco Use    Smoking status: Every Day     Current packs/day: 0.50     Types: Cigarettes    Smokeless tobacco: Never   Substance Use Topics    Alcohol use: Yes     Comment: occasional    Drug use: Yes     Types: Marijuana     Comment: occasional     Review of Systems   Constitutional:  Negative for chills, fever and unexpected weight change.   HENT:  Positive for congestion. Negative for drooling.    Eyes:  Negative for visual disturbance.   Respiratory:  Positive for cough, shortness of breath and wheezing. Negative for choking and chest tightness.    Cardiovascular:  Negative for chest pain.   Gastrointestinal:  Negative for " "nausea and vomiting.   Endocrine: Negative for polydipsia, polyphagia and polyuria.   Genitourinary:  Negative for dysuria.   Musculoskeletal:  Negative for back pain.   Skin:  Negative for wound.   Allergic/Immunologic: Negative for immunocompromised state.   Neurological:  Negative for headaches.   Hematological:  Does not bruise/bleed easily.   All other systems reviewed and are negative.      Physical Exam     Initial Vitals [12/12/24 1121]   BP Pulse Resp Temp SpO2   (!) 151/128 106 (!) 28 97.6 °F (36.4 °C) 97 %      MAP       --         Physical Exam    Nursing note and vitals reviewed.  Constitutional: She appears well-developed and well-nourished. She is not diaphoretic. No distress.   HENT:   Head: Normocephalic and atraumatic. Mouth/Throat: Uvula is midline, oropharynx is clear and moist and mucous membranes are normal. No oropharyngeal exudate.   Eyes: Conjunctivae and EOM are normal. Pupils are equal, round, and reactive to light. No scleral icterus.   Neck: Neck supple. No thyromegaly present. No tracheal tenderness present. No Brudzinski's sign and no Kernig's sign noted. Carotid bruit is not present. No JVD present.   Normal range of motion.  Cardiovascular:  Normal rate, regular rhythm, normal heart sounds and intact distal pulses.    PMI is not displaced.  Exam reveals no gallop and no friction rub.       No murmur heard.  Pulmonary/Chest: Accessory muscle usage present. Tachypnea noted. No respiratory distress. She has wheezes. She has rhonchi. She has no rales.   Tachypnea w/ "tri-poding" noted - (+) accessory mm respiration   Abdominal: Abdomen is soft. Bowel sounds are normal. She exhibits no distension and no mass. There is no abdominal tenderness.   Musculoskeletal:         General: Normal range of motion.      Cervical back: Normal range of motion and neck supple.     Lymphadenopathy:     She has no cervical adenopathy.   Neurological: She is alert and oriented to person, place, and time. She " has normal strength and normal reflexes. No sensory deficit. GCS score is 15. GCS eye subscore is 4. GCS verbal subscore is 5. GCS motor subscore is 6.   Skin: Skin is warm and dry. No rash noted. No pallor.   Psychiatric: She has a normal mood and affect. Her behavior is normal. Judgment and thought content normal.         ED Course   Procedures  Labs Reviewed   COVID/FLU A&B PCR - Normal       Result Value    Influenza A PCR Not Detected      Influenza B PCR Not Detected      SARS-CoV-2 PCR Not Detected      Narrative:     The Xpert Xpress SARS-CoV-2/FLU/RSV plus is a rapid, multiplexed real-time PCR test intended for the simultaneous qualitative detection and differentiation of SARS-CoV-2, Influenza A, Influenza B, and respiratory syncytial virus (RSV) viral RNA in either nasopharyngeal swab or nasal swab specimens.                Imaging Results    None          Medications   albuterol-ipratropium 2.5 mg-0.5 mg/3 mL nebulizer solution 3 mL (3 mLs Nebulization Given 12/12/24 1219)   methylPREDNISolone sodium succinate injection 125 mg (125 mg Intravenous Given 12/12/24 1158)   magnesium sulfate 2g in water 50mL IVPB (premix) (0 g Intravenous Stopped 12/12/24 1358)     Medical Decision Making  Amount and/or Complexity of Data Reviewed  Labs:  Decision-making details documented in ED Course.    Risk  Prescription drug management.               ED Course as of 01/26/25 1438   Thu Dec 12, 2024   1226 Influenza A, Molecular: Not Detected [KJ]   1226 Influenza B, Molecular: Not Detected [KJ]   1226 SARS-CoV2 (COVID-19) Qualitative PCR: Not Detected [KJ]      ED Course User Index  [KJ] Idris Alex MD                           Clinical Impression:  Final diagnoses:  [R06.02] SOB (shortness of breath)  [J45.41] Asthma in adult, moderate persistent, with acute exacerbation (Primary)          ED Disposition Condition    Discharge Stable          ED Prescriptions       Medication Sig Dispense Start Date End Date Auth.  Provider    albuterol-ipratropium (DUO-NEB) 2.5 mg-0.5 mg/3 mL nebulizer solution Take 3 mLs by nebulization every 6 (six) hours as needed for Wheezing. Rescue 75 mL 2024 Idris Alex MD    predniSONE (DELTASONE) 20 MG tablet () Take 1 tablet (20 mg total) by mouth 2 (two) times daily. for 3 days 6 tablet 2024 12/15/2024 Idris Alex MD          Follow-up Information    None       Afebrile. Vasomotor Stable. Ambulating in the ED. Pleased with care.  Patient specifically advised, by me, to RETURN to the emergency department for ANY deterioration, detrimental changes, failure to improve, feeling worse and/or ANY concerns whatsoever. Verbalized understanding and agreed to comply.        This treatment record was composed utilizing a combination of typing and the M*Modal Fluency Direct dictation system. Some errors in transcription, wording and spelling are, therefore, to be expected.       Idris Alex MD  [id number] [phone]

## 2024-12-13 VITALS
SYSTOLIC BLOOD PRESSURE: 135 MMHG | RESPIRATION RATE: 19 BRPM | HEART RATE: 107 BPM | DIASTOLIC BLOOD PRESSURE: 85 MMHG | TEMPERATURE: 99 F | OXYGEN SATURATION: 98 % | HEIGHT: 64 IN | WEIGHT: 180 LBS | BODY MASS INDEX: 30.73 KG/M2

## 2024-12-13 PROCEDURE — 94761 N-INVAS EAR/PLS OXIMETRY MLT: CPT

## 2024-12-13 PROCEDURE — 94640 AIRWAY INHALATION TREATMENT: CPT | Mod: XB

## 2024-12-13 PROCEDURE — 25000242 PHARM REV CODE 250 ALT 637 W/ HCPCS: Performed by: INTERNAL MEDICINE

## 2024-12-13 PROCEDURE — 25000003 PHARM REV CODE 250: Performed by: INTERNAL MEDICINE

## 2024-12-13 RX ORDER — ALBUTEROL SULFATE 0.83 MG/ML
2.5 SOLUTION RESPIRATORY (INHALATION)
Status: COMPLETED | OUTPATIENT
Start: 2024-12-13 | End: 2024-12-13

## 2024-12-13 RX ORDER — POTASSIUM CHLORIDE 20 MEQ/1
40 TABLET, EXTENDED RELEASE ORAL
Status: COMPLETED | OUTPATIENT
Start: 2024-12-13 | End: 2024-12-13

## 2024-12-13 RX ADMIN — POTASSIUM CHLORIDE 40 MEQ: 1500 TABLET, EXTENDED RELEASE ORAL at 01:12

## 2024-12-13 RX ADMIN — ALBUTEROL SULFATE 2.5 MG: 2.5 SOLUTION RESPIRATORY (INHALATION) at 01:12

## 2024-12-13 NOTE — ED PROVIDER NOTES
12/13/2024     10:42 PM  Source of History:  History obtained from the patient.     Chief complaint:  From Nurse Triage:  Shortness of Breath (Pt with SOB, history of asthma. Pt used inhaler and neb treatment with no relief. )    HISTORY OF PRESENT ILLNES:  Natalie Ibarra is a 29 y.o. female  has a past medical history of Anxiety disorder, unspecified, Asthma, and Depression. presenting with Shortness of Breath (Pt with SOB, history of asthma. Pt used inhaler and neb treatment with no relief. )    REVIEW OF SYSTEMS:   Constitutional symptoms:  No Fever. No Chills    Skin symptoms:  No Rash.    Eye symptoms:  No Visual disturbance reported.   ENMT symptoms:  No Sore throat,    Respiratory symptoms:  Shortness of Breath, Cough, Wheezing.    Cardiovascular symptoms:  No Chest Pain, No Palpitations.   Gastrointestinal symptoms:  No Abdominal Pain, No Nausea, No Vomiting, No Diarrhea, No Constipation.    Genitourinary symptoms:  No Dysuria,    Musculoskeletal symptoms:  No Back pain,    Neurologic symptoms:  No Headache, No Dizziness.    Psychiatric symptoms:  No Anxiety, No Depression, No Substance Abuse.              Additional review of systems information: Patient Denies Any Other Complaints.    All Other Systems Reviewed With Patient And Negative.    ALLEGIES:  Review of patient's allergies indicates:   Allergen Reactions    Peanut     Shellfish derived        MEDICINE LIST:  Current Outpatient Medications   Medication Instructions    albuterol-ipratropium (DUO-NEB) 2.5 mg-0.5 mg/3 mL nebulizer solution 3 mLs, Nebulization, Every 6 hours PRN, Rescue    hydrOXYzine HCL (ATARAX) 25 mg, Oral, Every 8 hours PRN    ondansetron (ZOFRAN) 8 mg, Oral, Every 8 hours PRN    predniSONE (DELTASONE) 20 mg, Oral, 2 times daily       PMH:  As per HPI and below:    Reviewed and updated in chart.    PAST MEDICAL HISTORY:  Past Medical History:   Diagnosis Date    Anxiety disorder, unspecified     Asthma     Depression         PAST  SURGICAL HISTORY:  Past Surgical History:   Procedure Laterality Date    ADENOIDECTOMY      CHOLECYSTECTOMY      DILATION AND CURETTAGE OF UTERUS  02/01/2024       SOCIAL HISTORY:  Social History     Tobacco Use    Smoking status: Every Day     Current packs/day: 0.50     Types: Cigarettes    Smokeless tobacco: Never   Substance Use Topics    Alcohol use: Yes     Comment: occasional    Drug use: Yes     Types: Marijuana     Comment: occasional       FAMILY HISTORY:  Family History   Problem Relation Name Age of Onset    Hypertension Mother          PROBLEM LIST:  Patient Active Problem List   Diagnosis    Gastritis    Asthma    Cyst of ovary    Obesity    Gastroesophageal reflux disease    Anxiety    Threatened miscarriage in early pregnancy        PHYSICAL EXAM:      ED Triage Vitals   BP    Pulse    Resp    Temp    SpO2         Vital Signs: Reviewed As In Chart.  General:  Alert, mild respiratory distress.   Eye:  Extraocular Movements Are Intact.   ENT: Mucus membranes are moist.   Cardiovascular:  Regular Rate And Rhythm, No Murmur, No Pedal Edema.    Respiratory:  Respirations labored, might Respiratory Distress, decreased Bilateral Air Entry, No Rales, bilateral diffuse Rhonchi.    Gastrointestinal:  Soft, Non Distended, Non Tenderness, Normal Bowel Sounds.    Neurological:  Alert And Oriented To Person, Place, Time, And Situation, Normal Motor Observed, Normal Speech Observed.  Musculoskeletal:  No Gross Deformity Noted.     Psychiatric:  Cooperative.    MEDICAL DECISION MAKING:  Reviewed Nurses Note. Reviewed Vital Signs.   Reviewed Pertinent old records, History and updated as necessary.  Medical Decision Making    Natalie Ibarra is 29 y.o. female who  has a past medical history of Anxiety disorder, unspecified, Asthma, and Depression. arrives in ER with c/o Shortness of Breath (Pt with SOB, history of asthma. Pt used inhaler and neb treatment with no relief. )                ED WORKUP FOR MEDICAL DECISION  MAKING:    ED ORDERS:  Orders Placed This Encounter   Procedures    X-Ray Chest PA And Lateral    Pregnancy, urine rapid    Urinalysis, Reflex to Urine Culture    CBC auto differential    Comprehensive metabolic panel    CBC with Differential    Urinalysis, Microscopic    Inhalation Treatment Once    Insert Saline lock IV       ED MEDICINES:  Medications   albuterol nebulizer solution 2.5 mg (has no administration in time range)   potassium chloride SA CR tablet 40 mEq (has no administration in time range)   albuterol-ipratropium 2.5 mg-0.5 mg/3 mL nebulizer solution 3 mL (3 mLs Nebulization Given 12/12/24 2248)   budesonide nebulizer solution 0.5 mg (0.5 mg Nebulization Given 12/12/24 2248)   dexAMETHasone injection 8 mg (8 mg Intramuscular Given 12/12/24 2254)                ED LABS ORDERED AND REVIEWED:  Admission on 12/12/2024   Component Date Value Ref Range Status    hCG Qualitative, Urine 12/12/2024 Negative  Negative Final    Color, UA 12/12/2024 Yellow  Yellow, Light-Yellow, Dark Yellow, [place], Straw Final    Appearance, UA 12/12/2024 Slightly Cloudy (A)  Clear Final    Specific Gravity, UA 12/12/2024 >=1.030  1.005 - 1.030 Final    pH, UA 12/12/2024 5.5  5.0 - 8.5 Final    Protein, UA 12/12/2024 Trace (A)  Negative Final    Glucose, UA 12/12/2024 2+ (A)  Negative, Normal Final    Ketones, UA 12/12/2024 1+ (A)  Negative Final    Blood, UA 12/12/2024 Negative  Negative Final    Bilirubin, UA 12/12/2024 Negative  Negative Final    Urobilinogen, UA 12/12/2024 0.2  0.2, 1.0, Normal Final    Nitrites, UA 12/12/2024 Negative  Negative Final    Leukocyte Esterase, UA 12/12/2024 1+ (A)  Negative Final    Sodium 12/12/2024 139  136 - 145 mmol/L Final    Potassium 12/12/2024 3.3 (L)  3.5 - 5.1 mmol/L Final    Chloride 12/12/2024 110 (H)  98 - 107 mmol/L Final    CO2 12/12/2024 18 (L)  22 - 29 mmol/L Final    Glucose 12/12/2024 162 (H)  74 - 100 mg/dL Final    Blood Urea Nitrogen 12/12/2024 10.0  7.0 - 18.7 mg/dL  Final    Creatinine 12/12/2024 0.81  0.55 - 1.02 mg/dL Final    Calcium 12/12/2024 9.6  8.4 - 10.2 mg/dL Final    Protein Total 12/12/2024 7.3  6.4 - 8.3 gm/dL Final    Albumin 12/12/2024 3.9  3.5 - 5.0 g/dL Final    Globulin 12/12/2024 3.4  2.4 - 3.5 gm/dL Final    Albumin/Globulin Ratio 12/12/2024 1.1  1.1 - 2.0 ratio Final    Bilirubin Total 12/12/2024 0.2  <=1.5 mg/dL Final    ALP 12/12/2024 52  40 - 150 unit/L Final    ALT 12/12/2024 11  0 - 55 unit/L Final    AST 12/12/2024 12  5 - 34 unit/L Final    eGFR 12/12/2024 >60  mL/min/1.73/m2 Final    Anion Gap 12/12/2024 11.0  mEq/L Final    BUN/Creatinine Ratio 12/12/2024 12   Final    WBC 12/12/2024 9.58  4.50 - 11.50 x10(3)/mcL Final    RBC 12/12/2024 4.57  4.20 - 5.40 x10(6)/mcL Final    Hgb 12/12/2024 14.3  12.0 - 16.0 g/dL Final    Hct 12/12/2024 43.0  37.0 - 47.0 % Final    MCV 12/12/2024 94.1 (H)  80.0 - 94.0 fL Final    MCH 12/12/2024 31.3 (H)  27.0 - 31.0 pg Final    MCHC 12/12/2024 33.3  33.0 - 36.0 g/dL Final    RDW 12/12/2024 13.0  11.5 - 17.0 % Final    Platelet 12/12/2024 307  130 - 400 x10(3)/mcL Final    MPV 12/12/2024 10.8 (H)  7.4 - 10.4 fL Final    Neut % 12/12/2024 88.2  % Final    Lymph % 12/12/2024 4.7  % Final    Mono % 12/12/2024 6.5  % Final    Eos % 12/12/2024 0.0  % Final    Basophil % 12/12/2024 0.2  % Final    Lymph # 12/12/2024 0.45 (L)  0.6 - 4.6 x10(3)/mcL Final    Neut # 12/12/2024 8.45  2.1 - 9.2 x10(3)/mcL Final    Mono # 12/12/2024 0.62  0.1 - 1.3 x10(3)/mcL Final    Eos # 12/12/2024 0.00  0 - 0.9 x10(3)/mcL Final    Baso # 12/12/2024 0.02  <=0.2 x10(3)/mcL Final    IG# 12/12/2024 0.04  0 - 0.04 x10(3)/mcL Final    IG% 12/12/2024 0.4  % Final    Bacteria, UA 12/12/2024 None Seen  None Seen, Rare, Occasional /HPF Final    RBC, UA 12/12/2024 None Seen  None Seen, 0-2, 3-5, 0-5 /HPF Final    WBC, UA 12/12/2024 3-5  None Seen, 0-2, 3-5, 0-5 /HPF Final    Squamous Epithelial Cells, UA 12/12/2024 Many (A)  None Seen, Rare, Occasional,  [facility] Final       RADIOLOGY STUDIES ORDERED AND REVIEWED:  Imaging Results              X-Ray Chest PA And Lateral (In process)  Result time 12/13/24 00:26:24                    ED Course as of 12/13/24 0111   Fri Dec 13, 2024   0107 Patient is comfortably lying down in the bed, denies any complaints, her O2 sat is 98% on room air, her boyfriend smokes, I will advise her to avoid anybody who smokes, she says she has medicines at home I will let her go home. [GQ]   0110 She was prescribed steroids today, I will advise her to take them. [GQ]      ED Course User Index  [GQ] Sandra Troy MD            PROCEDURES PERFORMED IN ED:  Procedures    DIAGNOSTIC IMPRESSION:        ICD-10-CM ICD-9-CM   1. Exacerbation of asthma, unspecified asthma severity, unspecified whether persistent  J45.901 493.92   2. Shortness of breath  R06.02 786.05         ED Disposition Condition    Discharge Stable               Medication List        CONTINUE taking these medications      albuterol-ipratropium 2.5 mg-0.5 mg/3 mL nebulizer solution  Commonly known as: DUO-NEB  Take 3 mLs by nebulization every 6 (six) hours as needed for Wheezing. Rescue     hydrOXYzine HCL 25 MG tablet  Commonly known as: ATARAX     ondansetron 8 MG tablet  Commonly known as: ZOFRAN  Take 1 tablet (8 mg total) by mouth every 8 (eight) hours as needed for Nausea.     predniSONE 20 MG tablet  Commonly known as: DELTASONE  Take 1 tablet (20 mg total) by mouth 2 (two) times daily. for 3 days               Follow-up Information       PMD In 2 days.                              ED Prescriptions    None       Follow-up Information       Follow up With Specialties Details Why Contact Info    PMD  In 2 days                 Sandra Troy MD  12/13/24 0111

## 2025-03-17 ENCOUNTER — HOSPITAL ENCOUNTER (EMERGENCY)
Facility: HOSPITAL | Age: 30
Discharge: HOME | End: 2025-03-17
Attending: INTERNAL MEDICINE
Payer: MEDICAID

## 2025-03-17 VITALS
OXYGEN SATURATION: 100 % | SYSTOLIC BLOOD PRESSURE: 145 MMHG | TEMPERATURE: 98 F | WEIGHT: 194 LBS | BODY MASS INDEX: 33.3 KG/M2 | HEART RATE: 100 BPM | RESPIRATION RATE: 18 BRPM | DIASTOLIC BLOOD PRESSURE: 90 MMHG
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
## 2025-03-17 LAB
ALBUMIN SERPL-MCNC: 4 G/DL (ref 3.5–5)
ALBUMIN/GLOB SERPL: 1.1 RATIO (ref 1.1–2)
ALP SERPL-CCNC: 50 UNIT/L (ref 40–150)
ALT SERPL-CCNC: 16 UNIT/L (ref 0–55)
ANION GAP SERPL CALC-SCNC: 8 MEQ/L
AST SERPL-CCNC: 15 UNIT/L (ref 5–34)
B-HCG UR QL: NEGATIVE
BASOPHILS # BLD AUTO: 0.07 X10(3)/MCL
BASOPHILS NFR BLD AUTO: 0.5 %
BILIRUB SERPL-MCNC: 0.3 MG/DL
BUN SERPL-MCNC: 12 MG/DL (ref 7–18.7)
CALCIUM SERPL-MCNC: 9.2 MG/DL (ref 8.4–10.2)
CHLORIDE SERPL-SCNC: 108 MMOL/L (ref 98–107)
CO2 SERPL-SCNC: 23 MMOL/L (ref 22–29)
CREAT SERPL-MCNC: 0.89 MG/DL (ref 0.55–1.02)
CREAT/UREA NIT SERPL: 13
EOSINOPHIL # BLD AUTO: 0.23 X10(3)/MCL (ref 0–0.9)
EOSINOPHIL NFR BLD AUTO: 1.6 %
ERYTHROCYTE [DISTWIDTH] IN BLOOD BY AUTOMATED COUNT: 13.3 % (ref 11.5–17)
GFR SERPLBLD CREATININE-BSD FMLA CKD-EPI: >60 ML/MIN/1.73/M2
GLOBULIN SER-MCNC: 3.7 GM/DL (ref 2.4–3.5)
GLUCOSE SERPL-MCNC: 82 MG/DL (ref 74–100)
HCT VFR BLD AUTO: 39.6 % (ref 37–47)
HGB BLD-MCNC: 13.2 G/DL (ref 12–16)
IMM GRANULOCYTES # BLD AUTO: 0.06 X10(3)/MCL (ref 0–0.04)
IMM GRANULOCYTES NFR BLD AUTO: 0.4 %
LYMPHOCYTES # BLD AUTO: 3.17 X10(3)/MCL (ref 0.6–4.6)
LYMPHOCYTES NFR BLD AUTO: 22.6 %
MCH RBC QN AUTO: 31.5 PG (ref 27–31)
MCHC RBC AUTO-ENTMCNC: 33.3 G/DL (ref 33–36)
MCV RBC AUTO: 94.5 FL (ref 80–94)
MONOCYTES # BLD AUTO: 0.8 X10(3)/MCL (ref 0.1–1.3)
MONOCYTES NFR BLD AUTO: 5.7 %
NEUTROPHILS # BLD AUTO: 9.68 X10(3)/MCL (ref 2.1–9.2)
NEUTROPHILS NFR BLD AUTO: 69.2 %
NRBC BLD AUTO-RTO: 0 %
PLATELET # BLD AUTO: 254 X10(3)/MCL (ref 130–400)
PMV BLD AUTO: 10.1 FL (ref 7.4–10.4)
POTASSIUM SERPL-SCNC: 3.9 MMOL/L (ref 3.5–5.1)
PROT SERPL-MCNC: 7.7 GM/DL (ref 6.4–8.3)
RBC # BLD AUTO: 4.19 X10(6)/MCL (ref 4.2–5.4)
SODIUM SERPL-SCNC: 139 MMOL/L (ref 136–145)
WBC # BLD AUTO: 14.01 X10(3)/MCL (ref 4.5–11.5)

## 2025-03-17 PROCEDURE — 81025 URINE PREGNANCY TEST: CPT | Performed by: INTERNAL MEDICINE

## 2025-03-17 PROCEDURE — 25000003 PHARM REV CODE 250: Performed by: INTERNAL MEDICINE

## 2025-03-17 PROCEDURE — 86706 HEP B SURFACE ANTIBODY: CPT | Performed by: INTERNAL MEDICINE

## 2025-03-17 PROCEDURE — 80053 COMPREHEN METABOLIC PANEL: CPT | Performed by: INTERNAL MEDICINE

## 2025-03-17 PROCEDURE — 90471 IMMUNIZATION ADMIN: CPT | Performed by: INTERNAL MEDICINE

## 2025-03-17 PROCEDURE — 99284 EMERGENCY DEPT VISIT MOD MDM: CPT | Mod: 25

## 2025-03-17 PROCEDURE — 63600175 PHARM REV CODE 636 W HCPCS: Performed by: INTERNAL MEDICINE

## 2025-03-17 PROCEDURE — 90715 TDAP VACCINE 7 YRS/> IM: CPT | Performed by: INTERNAL MEDICINE

## 2025-03-17 PROCEDURE — 86803 HEPATITIS C AB TEST: CPT | Performed by: INTERNAL MEDICINE

## 2025-03-17 PROCEDURE — 86705 HEP B CORE ANTIBODY IGM: CPT | Performed by: INTERNAL MEDICINE

## 2025-03-17 PROCEDURE — 85025 COMPLETE CBC W/AUTO DIFF WBC: CPT | Performed by: INTERNAL MEDICINE

## 2025-03-17 PROCEDURE — 87389 HIV-1 AG W/HIV-1&-2 AB AG IA: CPT | Performed by: INTERNAL MEDICINE

## 2025-03-17 RX ORDER — DOXYCYCLINE HYCLATE 100 MG
100 TABLET ORAL
Status: COMPLETED | OUTPATIENT
Start: 2025-03-17 | End: 2025-03-17

## 2025-03-17 RX ORDER — DOXYCYCLINE 100 MG/1
100 CAPSULE ORAL 2 TIMES DAILY
Qty: 20 CAPSULE | Refills: 0 | Status: SHIPPED | OUTPATIENT
Start: 2025-03-17 | End: 2025-03-27

## 2025-03-17 RX ADMIN — TETANUS TOXOID, REDUCED DIPHTHERIA TOXOID AND ACELLULAR PERTUSSIS VACCINE, ADSORBED 0.5 ML: 5; 2.5; 8; 8; 2.5 SUSPENSION INTRAMUSCULAR at 09:03

## 2025-03-17 RX ADMIN — DOXYCYCLINE HYCLATE 100 MG: 100 TABLET, COATED ORAL at 09:03

## 2025-03-18 LAB
HBV CORE IGM SERPL QL IA: NONREACTIVE
HBV SURFACE AB SER-ACNC: 1.49 MIU/ML
HBV SURFACE AB SERPL IA-ACNC: NONREACTIVE M[IU]/ML
HCV AB SERPL QL IA: NONREACTIVE
HIV 1+2 AB+HIV1 P24 AG SERPL QL IA: NONREACTIVE

## 2025-03-18 NOTE — ED PROVIDER NOTES
Encounter Date: 3/17/2025       History     Chief Complaint   Patient presents with    Body Fluid Exposure     Needlestick to R hand while taking out trash at work tonight.      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  Negative for activity change, appetite change, chills, diaphoresis, fatigue, fever and unexpected weight change.   HENT: Negative.  Negative for congestion, dental problem, drooling, ear discharge, ear pain, facial swelling, hearing loss, mouth sores, nosebleeds, postnasal drip, rhinorrhea, sinus pressure, sinus pain, sneezing, sore throat, tinnitus, trouble swallowing and voice change.    Eyes: Negative.  Negative for photophobia, pain, discharge, redness, itching and visual disturbance.   Respiratory: Negative.  Negative for apnea, cough, choking, chest tightness, shortness of breath, wheezing and stridor.    Cardiovascular: Negative.  Negative for chest pain, palpitations and leg swelling.   Gastrointestinal: Negative.  Negative for abdominal distention, abdominal pain, anal bleeding, blood in stool, constipation, diarrhea, nausea, rectal pain and vomiting.   Endocrine: Negative.   Negative for cold intolerance, heat intolerance, polydipsia, polyphagia and polyuria.   Genitourinary: Negative.  Negative for decreased urine volume, difficulty urinating, dyspareunia, dysuria, enuresis, flank pain, frequency, genital sores, hematuria, menstrual problem, pelvic pain, urgency, vaginal bleeding, vaginal discharge and vaginal pain.   Musculoskeletal: Negative.  Negative for arthralgias, back pain, gait problem, joint swelling, myalgias, neck pain and neck stiffness.   Skin:  Positive for wound. Negative for color change, pallor and rash.   Allergic/Immunologic: Negative.  Negative for environmental allergies, food allergies and immunocompromised state.   Neurological: Negative.  Negative for dizziness, tremors, seizures, syncope, facial asymmetry, speech difficulty, weakness, light-headedness, numbness and headaches.   Hematological: Negative.  Negative for adenopathy. Does not bruise/bleed easily.   Psychiatric/Behavioral: Negative.  Negative for agitation, behavioral problems, confusion, decreased concentration, dysphoric mood, hallucinations, self-injury, sleep disturbance and suicidal ideas. The patient is not nervous/anxious and is not hyperactive.    All other systems reviewed and are negative.      Physical Exam     Initial Vitals [03/17/25 2000]   BP Pulse Resp Temp SpO2   (!) 148/100 92 16 98.4 °F (36.9 °C) 99 %      MAP       --         Physical Exam    Nursing note and vitals reviewed.  Constitutional: She appears well-developed and well-nourished. She is not diaphoretic. No distress.   HENT:   Head: Normocephalic and atraumatic. Mouth/Throat: Oropharynx is clear and moist. No oropharyngeal exudate.   Eyes: Conjunctivae and EOM are normal. Pupils are equal, round, and reactive to light. No scleral icterus.   Neck: Neck supple. No JVD present.   Normal range of motion.  Cardiovascular:  Normal rate, regular rhythm, normal heart sounds and intact distal pulses.     Exam reveals no gallop and  no friction rub.       No murmur heard.  Pulmonary/Chest: Breath sounds normal. No respiratory distress. She has no wheezes. She exhibits no tenderness.   Abdominal: Abdomen is soft. Bowel sounds are normal. She exhibits no distension. There is no abdominal tenderness. There is no rebound.   Musculoskeletal:         General: Normal range of motion.        Hands:       Cervical back: Normal range of motion and neck supple.      Comments: Small puncture wound on dorsum of right hand     Neurological: She is alert and oriented to person, place, and time. She has normal strength and normal reflexes.   Skin: Skin is warm and dry. Capillary refill takes less than 2 seconds.   Psychiatric: Her speech is normal and behavior is normal. Judgment and thought content normal. Her mood appears anxious.         ED Course   Procedures  Labs Reviewed   COMPREHENSIVE METABOLIC PANEL - Abnormal       Result Value    Sodium 139      Potassium 3.9      Chloride 108 (*)     CO2 23      Glucose 82      Blood Urea Nitrogen 12.0      Creatinine 0.89      Calcium 9.2      Protein Total 7.7      Albumin 4.0      Globulin 3.7 (*)     Albumin/Globulin Ratio 1.1      Bilirubin Total 0.3      ALP 50      ALT 16      AST 15      eGFR >60      Anion Gap 8.0      BUN/Creatinine Ratio 13     CBC WITH DIFFERENTIAL - Abnormal    WBC 14.01 (*)     RBC 4.19 (*)     Hgb 13.2      Hct 39.6      MCV 94.5 (*)     MCH 31.5 (*)     MCHC 33.3      RDW 13.3      Platelet 254      MPV 10.1      Neut % 69.2      Lymph % 22.6      Mono % 5.7      Eos % 1.6      Basophil % 0.5      Imm Grans % 0.4      Neut # 9.68 (*)     Lymph # 3.17      Mono # 0.80      Eos # 0.23      Baso # 0.07      Imm Gran # 0.06 (*)     NRBC% 0.0     PREGNANCY TEST, URINE RAPID - Normal    hCG Qualitative, Urine Negative     CBC W/ AUTO DIFFERENTIAL    Narrative:     The following orders were created for panel order CBC auto differential.  Procedure                               Abnormality          Status                     ---------                               -----------         ------                     CBC with Differential[[id number]]       Abnormal            Final result                 Please view results for these tests on the individual orders.   HIV 1 / 2 ANTIBODY   HEPATITIS B SURFACE ANTIBODY   HEPATITIS B CORE ANTIBODY, IGM   HEPATITIS C ANTIBODY          Imaging Results    None          Medications   Tdap (BOOSTRIX) vaccine injection 0.5 mL (has no administration in time range)   doxycycline tablet 100 mg (has no administration in time range)     Medical Decision Making  30-year-old white female with a an inadvertent needle stick.  Differential diagnosis includes was not limited to exposure to body fluids which contains HIV, hep B, hep C, bacteria or other viruses.  Had a long discussion about options.  We are and a low risk area however I did offer her prep at which time she declines.  Will give a Boostrix tetanus shot and cover with doxycycline for wound care and I have ordered hep B and C and HIV panels with a CBC CMP and a urinary pregnancy test.  Recommend she follow up with her primary of the health unit within a week or 2 and will call with results and recommend she get retested at a later date to verify.  Discussed strict precautions to return if she starts to experience any viral type symptoms    Problems Addressed:  Accidental needlestick injury with exposure to body fluid: acute illness or injury    Amount and/or Complexity of Data Reviewed  Labs: ordered. Decision-making details documented in ED Course.    Risk  Prescription drug management.                                      Clinical Impression:  Final diagnoses:  [W46.1XXA] Accidental needlestick injury with exposure to body fluid (Primary)          ED Disposition Condition    Discharge Stable          ED Prescriptions       Medication Sig Dispense Start Date End Date Auth. Provider    doxycycline (VIBRAMYCIN) 100 MG  "Cap Take 1 capsule (100 mg total) by mouth 2 (two) times daily. for 10 days 20 capsule 3/17/2025 3/27/2025 Travis Vines MD          Follow-up Information       Follow up With Specialties Details Why Contact [facility] unit  In 1 week            Portions of this note have been created with voice recognition software. Occasional "wrong-words" or "sound alike" substitutions may have occurred due to inherent limitations of voice software. Please read the note carefully and recognize, using context, word substitutions may have occurred.           [1]   Social History  Tobacco Use    Smoking status: Every Day     Current packs/day: 0.50     Types: Cigarettes    Smokeless tobacco: Never   Substance Use Topics    Alcohol use: Yes     Comment: occasional    Drug use: Yes     Types: Marijuana     Comment: occasional        Travis Vines MD  03/17/25 5621    "

## 2025-05-11 ENCOUNTER — HOSPITAL ENCOUNTER (EMERGENCY)
Facility: HOSPITAL | Age: 30
Discharge: HOME | End: 2025-05-11
Attending: FAMILY MEDICINE
Payer: MEDICAID

## 2025-05-11 VITALS
OXYGEN SATURATION: 100 % | TEMPERATURE: 99 F | DIASTOLIC BLOOD PRESSURE: 73 MMHG | WEIGHT: 194 LBS | BODY MASS INDEX: 33.12 KG/M2 | HEIGHT: 64 IN | SYSTOLIC BLOOD PRESSURE: 133 MMHG | HEART RATE: 84 BPM | RESPIRATION RATE: 20 BRPM

## 2025-05-11 DIAGNOSIS — R21 RASH: ICD-10-CM

## 2025-05-11 DIAGNOSIS — J45.901 EXACERBATION OF ASTHMA, UNSPECIFIED ASTHMA SEVERITY, UNSPECIFIED WHETHER PERSISTENT: Primary | ICD-10-CM

## 2025-05-11 DIAGNOSIS — R07.9 CHEST PAIN: ICD-10-CM

## 2025-05-11 LAB
ALBUMIN SERPL-MCNC: 3.8 G/DL (ref 3.5–5)
ALBUMIN/GLOB SERPL: 1.1 RATIO (ref 1.1–2)
ALP SERPL-CCNC: 53 UNIT/L (ref 40–150)
ALT SERPL-CCNC: 14 UNIT/L (ref 0–55)
ANION GAP SERPL CALC-SCNC: 8 MEQ/L
AST SERPL-CCNC: 21 UNIT/L (ref 11–45)
B-HCG UR QL: NEGATIVE
BASOPHILS # BLD AUTO: 0.06 X10(3)/MCL
BASOPHILS NFR BLD AUTO: 0.7 %
BILIRUB SERPL-MCNC: 0.4 MG/DL
BUN SERPL-MCNC: 10 MG/DL (ref 7–18.7)
CALCIUM SERPL-MCNC: 9.4 MG/DL (ref 8.4–10.2)
CHLORIDE SERPL-SCNC: 108 MMOL/L (ref 98–107)
CO2 SERPL-SCNC: 24 MMOL/L (ref 22–29)
CREAT SERPL-MCNC: 0.79 MG/DL (ref 0.55–1.02)
CREAT/UREA NIT SERPL: 13
EOSINOPHIL # BLD AUTO: 0.34 X10(3)/MCL (ref 0–0.9)
EOSINOPHIL NFR BLD AUTO: 4.2 %
ERYTHROCYTE [DISTWIDTH] IN BLOOD BY AUTOMATED COUNT: 12.9 % (ref 11.5–17)
GFR SERPLBLD CREATININE-BSD FMLA CKD-EPI: >60 ML/MIN/1.73/M2
GLOBULIN SER-MCNC: 3.6 GM/DL (ref 2.4–3.5)
GLUCOSE SERPL-MCNC: 85 MG/DL (ref 74–100)
HCT VFR BLD AUTO: 40.1 % (ref 37–47)
HGB BLD-MCNC: 13.6 G/DL (ref 12–16)
IMM GRANULOCYTES # BLD AUTO: 0.03 X10(3)/MCL (ref 0–0.04)
IMM GRANULOCYTES NFR BLD AUTO: 0.4 %
LYMPHOCYTES # BLD AUTO: 2.12 X10(3)/MCL (ref 0.6–4.6)
LYMPHOCYTES NFR BLD AUTO: 26.4 %
MCH RBC QN AUTO: 31.6 PG (ref 27–31)
MCHC RBC AUTO-ENTMCNC: 33.9 G/DL (ref 33–36)
MCV RBC AUTO: 93.3 FL (ref 80–94)
MONOCYTES # BLD AUTO: 0.58 X10(3)/MCL (ref 0.1–1.3)
MONOCYTES NFR BLD AUTO: 7.2 %
NEUTROPHILS # BLD AUTO: 4.91 X10(3)/MCL (ref 2.1–9.2)
NEUTROPHILS NFR BLD AUTO: 61.1 %
NRBC BLD AUTO-RTO: 0 %
OHS QRS DURATION: 82 MS
OHS QTC CALCULATION: 420 MS
PLATELET # BLD AUTO: 251 X10(3)/MCL (ref 130–400)
PMV BLD AUTO: 10.1 FL (ref 7.4–10.4)
POTASSIUM SERPL-SCNC: 3.8 MMOL/L (ref 3.5–5.1)
PROT SERPL-MCNC: 7.4 GM/DL (ref 6.4–8.3)
RBC # BLD AUTO: 4.3 X10(6)/MCL (ref 4.2–5.4)
SODIUM SERPL-SCNC: 140 MMOL/L (ref 136–145)
TROPONIN I SERPL-MCNC: <0.01 NG/ML (ref 0–0.04)
WBC # BLD AUTO: 8.04 X10(3)/MCL (ref 4.5–11.5)

## 2025-05-11 PROCEDURE — 85025 COMPLETE CBC W/AUTO DIFF WBC: CPT

## 2025-05-11 PROCEDURE — 80053 COMPREHEN METABOLIC PANEL: CPT

## 2025-05-11 PROCEDURE — 81025 URINE PREGNANCY TEST: CPT

## 2025-05-11 PROCEDURE — 93005 ELECTROCARDIOGRAM TRACING: CPT

## 2025-05-11 PROCEDURE — 96372 THER/PROPH/DIAG INJ SC/IM: CPT

## 2025-05-11 PROCEDURE — 25000242 PHARM REV CODE 250 ALT 637 W/ HCPCS

## 2025-05-11 PROCEDURE — 94640 AIRWAY INHALATION TREATMENT: CPT

## 2025-05-11 PROCEDURE — 84484 ASSAY OF TROPONIN QUANT: CPT

## 2025-05-11 PROCEDURE — 94761 N-INVAS EAR/PLS OXIMETRY MLT: CPT

## 2025-05-11 PROCEDURE — 99285 EMERGENCY DEPT VISIT HI MDM: CPT | Mod: 25

## 2025-05-11 PROCEDURE — 93010 ELECTROCARDIOGRAM REPORT: CPT | Mod: ,,, | Performed by: INTERNAL MEDICINE

## 2025-05-11 PROCEDURE — 63600175 PHARM REV CODE 636 W HCPCS

## 2025-05-11 RX ORDER — HYDROXYZINE 50 MG/ML
50 INJECTION, SOLUTION INTRAMUSCULAR ONCE
Status: COMPLETED | OUTPATIENT
Start: 2025-05-11 | End: 2025-05-11

## 2025-05-11 RX ORDER — IPRATROPIUM BROMIDE AND ALBUTEROL SULFATE 2.5; .5 MG/3ML; MG/3ML
3 SOLUTION RESPIRATORY (INHALATION)
Status: COMPLETED | OUTPATIENT
Start: 2025-05-11 | End: 2025-05-11

## 2025-05-11 RX ORDER — DEXAMETHASONE SODIUM PHOSPHATE 4 MG/ML
8 INJECTION, SOLUTION INTRA-ARTICULAR; INTRALESIONAL; INTRAMUSCULAR; INTRAVENOUS; SOFT TISSUE
Status: COMPLETED | OUTPATIENT
Start: 2025-05-11 | End: 2025-05-11

## 2025-05-11 RX ADMIN — HYDROXYZINE HYDROCHLORIDE 50 MG: 50 INJECTION, SOLUTION INTRAMUSCULAR at 03:05

## 2025-05-11 RX ADMIN — DEXAMETHASONE SODIUM PHOSPHATE 8 MG: 4 INJECTION, SOLUTION INTRA-ARTICULAR; INTRALESIONAL; INTRAMUSCULAR; INTRAVENOUS; SOFT TISSUE at 04:05

## 2025-05-11 RX ADMIN — IPRATROPIUM BROMIDE AND ALBUTEROL SULFATE 3 ML: .5; 3 SOLUTION RESPIRATORY (INHALATION) at 03:05

## 2025-05-11 NOTE — ED PROVIDER NOTES
Encounter Date: 5/11/2025       History     Chief Complaint   Patient presents with    Chest Pain     Chest pain and rash on hand that started today. Also reports tingling to R hand radiating up arm      See mdm    The history is provided by the patient.     Review of patient's allergies indicates:   Allergen Reactions    Peanut     Shellfish derived      Past Medical History:   Diagnosis Date    Anxiety disorder, unspecified     Asthma     Depression      Past Surgical History:   Procedure Laterality Date    ADENOIDECTOMY      CHOLECYSTECTOMY      DILATION AND CURETTAGE OF UTERUS  02/01/2024     Family History   Problem Relation Name Age of Onset    Hypertension Mother       Social History[1]  Review of Systems   Cardiovascular:  Positive for chest pain.   Skin:  Positive for rash.   All other systems reviewed and are negative.      Physical Exam     Initial Vitals [05/11/25 1451]   BP Pulse Resp Temp SpO2   133/73 92 (!) 22 99.4 °F (37.4 °C) 100 %      MAP       --         Physical Exam    Nursing note and vitals reviewed.  Constitutional: She appears well-developed.   HENT:   Head: Normocephalic.   Right Ear: External ear normal.   Left Ear: External ear normal.   Nose: Nose normal. Mouth/Throat: Oropharynx is clear and moist.   Eyes: Pupils are equal, round, and reactive to light.   Neck:   Normal range of motion.  Cardiovascular:  Normal rate.           Pulmonary/Chest: No accessory muscle usage. No tachypnea. No respiratory distress. She has wheezes in the right upper field, the right lower field and the left upper field.   Abdominal: Abdomen is soft. Bowel sounds are normal.   Musculoskeletal:         General: Normal range of motion.      Cervical back: Normal range of motion.     Neurological: She is alert and oriented to person, place, and time. She has normal strength. No cranial nerve deficit or sensory deficit. GCS score is 15. GCS eye subscore is 4. GCS verbal subscore is 5. GCS motor subscore is 6.    Skin: Skin is warm. Capillary refill takes less than 2 seconds. Petechiae and rash noted.   Petechial rash noted to right hand that has spread to right lower forearm.   Psychiatric: She has a normal mood and affect.         ED Course   Procedures  Labs Reviewed   COMPREHENSIVE METABOLIC PANEL - Abnormal       Result Value    Sodium 140      Potassium 3.8      Chloride 108 (*)     CO2 24      Glucose 85      Blood Urea Nitrogen 10.0      Creatinine 0.79      Calcium 9.4      Protein Total 7.4      Albumin 3.8      Globulin 3.6 (*)     Albumin/Globulin Ratio 1.1      Bilirubin Total 0.4      ALP 53      ALT 14      AST 21      eGFR >60      Anion Gap 8.0      BUN/Creatinine Ratio 13     CBC WITH DIFFERENTIAL - Abnormal    WBC 8.04      RBC 4.30      Hgb 13.6      Hct 40.1      MCV 93.3      MCH 31.6 (*)     MCHC 33.9      RDW 12.9      Platelet 251      MPV 10.1      Neut % 61.1      Lymph % 26.4      Mono % 7.2      Eos % 4.2      Basophil % 0.7      Imm Grans % 0.4      Neut # 4.91      Lymph # 2.12      Mono # 0.58      Eos # 0.34      Baso # 0.06      Imm Gran # 0.03      NRBC% 0.0     TROPONIN I - Normal    Troponin-I <0.010     PREGNANCY TEST, URINE RAPID - Normal    hCG Qualitative, Urine Negative     CBC W/ AUTO DIFFERENTIAL    Narrative:     The following orders were created for panel order CBC auto differential.  Procedure                               Abnormality         Status                     ---------                               -----------         ------                     CBC with Differential[[id number]]       Abnormal            Final result                 Please view results for these tests on the individual orders.     EKG Readings: (Independently Interpreted)   Initial Reading: No STEMI. Rhythm: Normal Sinus Rhythm. Heart Rate: 91. Ectopy: No Ectopy. Conduction: Normal. ST Segments: Normal ST Segments. T Waves: Normal. Axis: Normal. Clinical Impression: Normal Sinus Rhythm     ECG Results               EKG 12-lead (Final result)        Collection Time Result Time QRS Duration OHS QTC Calculation    05/11/25 14:51:41 05/11/25 15:49:54 82 420                     Final result by Interface, Lab In [facility] (05/11/25 15:49:57)                   Narrative:    Test Reason : R07.9,    Vent. Rate :  91 BPM     Atrial Rate :  91 BPM     P-R Int : 130 ms          QRS Dur :  82 ms      QT Int : 342 ms       P-R-T Axes :  61  77  53 degrees    QTcB Int : 420 ms    Normal sinus rhythm  Normal ECG  No previous ECGs available  Confirmed by Simon Renteria ([id number]) on 5/11/2025 3:49:51 PM    Referred By: AAAREFERRAL SELF           Confirmed By: Simon Renteria                                  Imaging Results              X-Ray Chest 1 View (Preliminary result)  Result time 05/11/25 16:19:07      Wet Read by Zander Salomon Jr., MD (05/11/25 16:19:07, [facility] - Emergency Dept, Emergency Medicine)    CXR normal                                     Medications   dexAMETHasone injection 8 mg (has no administration in time range)   albuterol-ipratropium 2.5 mg-0.5 mg/3 mL nebulizer solution 3 mL (3 mLs Nebulization Given 5/11/25 1517)   hydrOXYzine injection 50 mg (50 mg Intramuscular Given 5/11/25 1538)     Medical Decision Making  30 year old patient presents to the ED c/o chest pains and rash to right hand that extends up right arm. Patient states that chest pains and rash began this AM stating that she took benadryl at 6am and 11am for rash but denies any relief. Patient states that she also took albuterol inhaler this AM w/o any relief of chest pains. Patient does have a history of asthma but denies daily use of albuterol. Patient denies any difficulty breathing. Patient denies fevers at home.     Amount and/or Complexity of Data Reviewed  Labs: ordered.  Radiology: ordered and independent interpretation performed.    Risk  Prescription drug management.      Additional MDM:   Differential Diagnosis:   Other: The  following diagnoses were also considered and will be evaluated: ACS, Panic disorder and Respiratory failure.            ED Course as of 05/11/25 1637   Sun May 11, 2025   1632 Patient evaluated. Troponin negative, EKG stable, Chest XR clear. Patient treated with duo neb and admits that chest tightness improved. Patient will be d/c home. Patient states that she already has albuterol at home and patient given IM dexamethasone. Patient agrees with treatment plan.f/u with pcp in 3 days. [DL]      ED Course User Index  [DL] Rodney Subramanian NP                           Clinical Impression:  Final diagnoses:  [R07.9] Chest pain  [J45.901] Exacerbation of asthma, unspecified asthma severity, unspecified whether persistent (Primary)  [R21] Rash          ED Disposition Condition    Discharge Stable          ED Prescriptions    None       Follow-up Information       Follow up With Specialties Details Why Contact Info    pcp  In 3 days                   [1]   Social History  Tobacco Use    Smoking status: Every Day     Current packs/day: 0.50     Types: Cigarettes    Smokeless tobacco: Never   Substance Use Topics    Alcohol use: Yes     Comment: occasional    Drug use: Yes     Types: Marijuana     Comment: occasional        Rodney Subramanian NP  05/11/25 1637

## 2025-07-30 ENCOUNTER — HOSPITAL ENCOUNTER (EMERGENCY)
Facility: HOSPITAL | Age: 30
Discharge: HOME | End: 2025-07-30
Attending: EMERGENCY MEDICINE
Payer: MEDICAID

## 2025-07-30 VITALS
RESPIRATION RATE: 20 BRPM | HEIGHT: 64 IN | TEMPERATURE: 98 F | OXYGEN SATURATION: 99 % | DIASTOLIC BLOOD PRESSURE: 85 MMHG | WEIGHT: 194.69 LBS | BODY MASS INDEX: 33.24 KG/M2 | HEART RATE: 71 BPM | SYSTOLIC BLOOD PRESSURE: 118 MMHG

## 2025-07-30 DIAGNOSIS — J45.909 ASTHMA, UNSPECIFIED ASTHMA SEVERITY, UNSPECIFIED WHETHER COMPLICATED, UNSPECIFIED WHETHER PERSISTENT: ICD-10-CM

## 2025-07-30 DIAGNOSIS — R10.9 FLANK PAIN: Primary | ICD-10-CM

## 2025-07-30 LAB
AMORPH URATE CRY URNS QL MICRO: ABNORMAL /HPF
ANION GAP SERPL CALC-SCNC: 9 MEQ/L
B-HCG FREE SERPL-ACNC: <2.42 MIU/ML
BACTERIA #/AREA URNS AUTO: ABNORMAL /HPF
BASOPHILS # BLD AUTO: 0.09 X10(3)/MCL
BASOPHILS NFR BLD AUTO: 0.6 %
BILIRUB UR QL STRIP.AUTO: NEGATIVE
BUN SERPL-MCNC: 13 MG/DL (ref 7–18.7)
CALCIUM SERPL-MCNC: 9.6 MG/DL (ref 8.4–10.2)
CHLORIDE SERPL-SCNC: 107 MMOL/L (ref 98–107)
CLARITY UR: ABNORMAL
CO2 SERPL-SCNC: 24 MMOL/L (ref 22–29)
COLOR UR AUTO: YELLOW
CREAT SERPL-MCNC: 0.8 MG/DL (ref 0.55–1.02)
CREAT/UREA NIT SERPL: 16
EOSINOPHIL # BLD AUTO: 0.67 X10(3)/MCL (ref 0–0.9)
EOSINOPHIL NFR BLD AUTO: 4.7 %
ERYTHROCYTE [DISTWIDTH] IN BLOOD BY AUTOMATED COUNT: 13.5 % (ref 11.5–17)
GFR SERPLBLD CREATININE-BSD FMLA CKD-EPI: >60 ML/MIN/1.73/M2
GLUCOSE SERPL-MCNC: 80 MG/DL (ref 74–100)
GLUCOSE UR QL STRIP: NEGATIVE
HCT VFR BLD AUTO: 42.3 % (ref 37–47)
HGB BLD-MCNC: 14.2 G/DL (ref 12–16)
HGB UR QL STRIP: ABNORMAL
IMM GRANULOCYTES # BLD AUTO: 0.08 X10(3)/MCL (ref 0–0.04)
IMM GRANULOCYTES NFR BLD AUTO: 0.6 %
KETONES UR QL STRIP: NEGATIVE
LEUKOCYTE ESTERASE UR QL STRIP: NEGATIVE
LYMPHOCYTES # BLD AUTO: 3.84 X10(3)/MCL (ref 0.6–4.6)
LYMPHOCYTES NFR BLD AUTO: 26.9 %
MCH RBC QN AUTO: 31.4 PG (ref 27–31)
MCHC RBC AUTO-ENTMCNC: 33.6 G/DL (ref 33–36)
MCV RBC AUTO: 93.6 FL (ref 80–94)
MONOCYTES # BLD AUTO: 0.91 X10(3)/MCL (ref 0.1–1.3)
MONOCYTES NFR BLD AUTO: 6.4 %
NEUTROPHILS # BLD AUTO: 8.7 X10(3)/MCL (ref 2.1–9.2)
NEUTROPHILS NFR BLD AUTO: 60.8 %
NITRITE UR QL STRIP: NEGATIVE
NRBC BLD AUTO-RTO: 0 %
PH UR STRIP: 7 [PH]
PLATELET # BLD AUTO: 273 X10(3)/MCL (ref 130–400)
PMV BLD AUTO: 10.2 FL (ref 7.4–10.4)
POTASSIUM SERPL-SCNC: 4.3 MMOL/L (ref 3.5–5.1)
PROT UR QL STRIP: NEGATIVE
RBC # BLD AUTO: 4.52 X10(6)/MCL (ref 4.2–5.4)
RBC #/AREA URNS AUTO: ABNORMAL /HPF
SODIUM SERPL-SCNC: 140 MMOL/L (ref 136–145)
SP GR UR STRIP.AUTO: 1.02 (ref 1–1.03)
SQUAMOUS #/AREA URNS AUTO: ABNORMAL /HPF
UROBILINOGEN UR STRIP-ACNC: 0.2
WBC # BLD AUTO: 14.29 X10(3)/MCL (ref 4.5–11.5)
WBC #/AREA URNS AUTO: ABNORMAL /HPF

## 2025-07-30 PROCEDURE — 96375 TX/PRO/DX INJ NEW DRUG ADDON: CPT

## 2025-07-30 PROCEDURE — 81003 URINALYSIS AUTO W/O SCOPE: CPT | Performed by: EMERGENCY MEDICINE

## 2025-07-30 PROCEDURE — 85025 COMPLETE CBC W/AUTO DIFF WBC: CPT | Performed by: EMERGENCY MEDICINE

## 2025-07-30 PROCEDURE — 25000003 PHARM REV CODE 250: Performed by: EMERGENCY MEDICINE

## 2025-07-30 PROCEDURE — 25000242 PHARM REV CODE 250 ALT 637 W/ HCPCS: Performed by: EMERGENCY MEDICINE

## 2025-07-30 PROCEDURE — 80048 BASIC METABOLIC PNL TOTAL CA: CPT | Performed by: EMERGENCY MEDICINE

## 2025-07-30 PROCEDURE — 94761 N-INVAS EAR/PLS OXIMETRY MLT: CPT

## 2025-07-30 PROCEDURE — 99285 EMERGENCY DEPT VISIT HI MDM: CPT | Mod: 25

## 2025-07-30 PROCEDURE — 94640 AIRWAY INHALATION TREATMENT: CPT

## 2025-07-30 PROCEDURE — 63600175 PHARM REV CODE 636 W HCPCS: Performed by: EMERGENCY MEDICINE

## 2025-07-30 PROCEDURE — 96374 THER/PROPH/DIAG INJ IV PUSH: CPT

## 2025-07-30 PROCEDURE — 96361 HYDRATE IV INFUSION ADD-ON: CPT

## 2025-07-30 PROCEDURE — 84702 CHORIONIC GONADOTROPIN TEST: CPT | Performed by: EMERGENCY MEDICINE

## 2025-07-30 RX ORDER — MORPHINE SULFATE 4 MG/ML
4 INJECTION, SOLUTION INTRAMUSCULAR; INTRAVENOUS
Refills: 0 | Status: COMPLETED | OUTPATIENT
Start: 2025-07-30 | End: 2025-07-30

## 2025-07-30 RX ORDER — PREDNISONE 20 MG/1
60 TABLET ORAL
Status: COMPLETED | OUTPATIENT
Start: 2025-07-30 | End: 2025-07-30

## 2025-07-30 RX ORDER — METHYLPREDNISOLONE 4 MG/1
TABLET ORAL
Qty: 21 EACH | Refills: 0 | Status: SHIPPED | OUTPATIENT
Start: 2025-07-30

## 2025-07-30 RX ORDER — ONDANSETRON HYDROCHLORIDE 2 MG/ML
4 INJECTION, SOLUTION INTRAVENOUS
Status: COMPLETED | OUTPATIENT
Start: 2025-07-30 | End: 2025-07-30

## 2025-07-30 RX ORDER — IPRATROPIUM BROMIDE AND ALBUTEROL SULFATE 2.5; .5 MG/3ML; MG/3ML
3 SOLUTION RESPIRATORY (INHALATION)
Status: COMPLETED | OUTPATIENT
Start: 2025-07-30 | End: 2025-07-30

## 2025-07-30 RX ORDER — CYCLOBENZAPRINE HCL 10 MG
10 TABLET ORAL 3 TIMES DAILY PRN
Qty: 15 TABLET | Refills: 0 | Status: SHIPPED | OUTPATIENT
Start: 2025-07-30 | End: 2025-08-04

## 2025-07-30 RX ADMIN — PREDNISONE 60 MG: 20 TABLET ORAL at 02:07

## 2025-07-30 RX ADMIN — IPRATROPIUM BROMIDE AND ALBUTEROL SULFATE 3 ML: 2.5; .5 SOLUTION RESPIRATORY (INHALATION) at 02:07

## 2025-07-30 RX ADMIN — SODIUM CHLORIDE 1000 ML: 9 INJECTION, SOLUTION INTRAVENOUS at 01:07

## 2025-07-30 RX ADMIN — MORPHINE SULFATE 4 MG: 4 INJECTION, SOLUTION INTRAMUSCULAR; INTRAVENOUS at 01:07

## 2025-07-30 RX ADMIN — ONDANSETRON 4 MG: 2 INJECTION INTRAMUSCULAR; INTRAVENOUS at 01:07

## 2025-08-13 ENCOUNTER — HOSPITAL ENCOUNTER (EMERGENCY)
Facility: HOSPITAL | Age: 30
Discharge: HOME | End: 2025-08-13
Payer: MEDICAID

## 2025-08-13 VITALS
RESPIRATION RATE: 18 BRPM | BODY MASS INDEX: 32.99 KG/M2 | DIASTOLIC BLOOD PRESSURE: 92 MMHG | SYSTOLIC BLOOD PRESSURE: 116 MMHG | HEIGHT: 65 IN | HEART RATE: 72 BPM | TEMPERATURE: 98 F | OXYGEN SATURATION: 98 % | WEIGHT: 198 LBS

## 2025-08-13 DIAGNOSIS — R10.2 PELVIC PAIN: Primary | ICD-10-CM

## 2025-08-13 LAB
AMPHET UR QL SCN: NEGATIVE
B-HCG UR QL: NEGATIVE
BARBITURATE SCN PRESENT UR: NEGATIVE
BENZODIAZ UR QL SCN: NEGATIVE
BILIRUB UR QL STRIP.AUTO: NEGATIVE
CANNABINOIDS UR QL SCN: POSITIVE
CLARITY UR: CLEAR
COCAINE UR QL SCN: NEGATIVE
COLOR UR AUTO: YELLOW
FENTANYL UR QL SCN: NEGATIVE
GLUCOSE UR QL STRIP: NEGATIVE
HGB UR QL STRIP: NEGATIVE
KETONES UR QL STRIP: ABNORMAL
LEUKOCYTE ESTERASE UR QL STRIP: NEGATIVE
METHADONE UR QL SCN: NEGATIVE
NITRITE UR QL STRIP: NEGATIVE
OPIATES UR QL SCN: NEGATIVE
PCP UR QL: NEGATIVE
PH UR STRIP: 6 [PH]
PH UR: 6 [PH] (ref 5–8)
PROT UR QL STRIP: ABNORMAL
SP GR UR STRIP.AUTO: >=1.03 (ref 1–1.03)
UROBILINOGEN UR STRIP-ACNC: 0.2

## 2025-08-13 PROCEDURE — 81003 URINALYSIS AUTO W/O SCOPE: CPT

## 2025-08-13 PROCEDURE — 63600175 PHARM REV CODE 636 W HCPCS: Mod: JZ,TB

## 2025-08-13 PROCEDURE — 80307 DRUG TEST PRSMV CHEM ANLYZR: CPT

## 2025-08-13 PROCEDURE — 81025 URINE PREGNANCY TEST: CPT

## 2025-08-13 PROCEDURE — 99285 EMERGENCY DEPT VISIT HI MDM: CPT | Mod: 25

## 2025-08-13 PROCEDURE — 96372 THER/PROPH/DIAG INJ SC/IM: CPT

## 2025-08-13 RX ORDER — NAPROXEN 500 MG/1
500 TABLET ORAL 2 TIMES DAILY
Qty: 20 TABLET | Refills: 0 | Status: SHIPPED | OUTPATIENT
Start: 2025-08-13

## 2025-08-13 RX ORDER — KETOROLAC TROMETHAMINE 30 MG/ML
60 INJECTION, SOLUTION INTRAMUSCULAR; INTRAVENOUS
Status: COMPLETED | OUTPATIENT
Start: 2025-08-13 | End: 2025-08-13

## 2025-08-13 RX ADMIN — KETOROLAC TROMETHAMINE 60 MG: 30 INJECTION, SOLUTION INTRAMUSCULAR; INTRAVENOUS at 08:08
